# Patient Record
Sex: FEMALE | Race: BLACK OR AFRICAN AMERICAN | NOT HISPANIC OR LATINO | Employment: FULL TIME | URBAN - METROPOLITAN AREA
[De-identification: names, ages, dates, MRNs, and addresses within clinical notes are randomized per-mention and may not be internally consistent; named-entity substitution may affect disease eponyms.]

---

## 2023-11-14 ENCOUNTER — APPOINTMENT (OUTPATIENT)
Dept: RADIOLOGY | Facility: HOSPITAL | Age: 48
End: 2023-11-14
Payer: COMMERCIAL

## 2023-11-14 ENCOUNTER — HOSPITAL ENCOUNTER (EMERGENCY)
Facility: HOSPITAL | Age: 48
Discharge: HOME | End: 2023-11-15
Payer: COMMERCIAL

## 2023-11-14 VITALS
RESPIRATION RATE: 18 BRPM | SYSTOLIC BLOOD PRESSURE: 137 MMHG | WEIGHT: 219 LBS | TEMPERATURE: 97.8 F | HEIGHT: 62 IN | OXYGEN SATURATION: 100 % | DIASTOLIC BLOOD PRESSURE: 92 MMHG | BODY MASS INDEX: 40.3 KG/M2

## 2023-11-14 DIAGNOSIS — R10.9 FLANK PAIN: Primary | ICD-10-CM

## 2023-11-14 DIAGNOSIS — K76.89 HEPATIC CYST: ICD-10-CM

## 2023-11-14 LAB
ALBUMIN SERPL BCP-MCNC: 4 G/DL (ref 3.4–5)
ALP SERPL-CCNC: 60 U/L (ref 33–110)
ALT SERPL W P-5'-P-CCNC: 18 U/L (ref 7–45)
ANION GAP SERPL CALC-SCNC: 12 MMOL/L (ref 10–20)
APPEARANCE UR: CLEAR
AST SERPL W P-5'-P-CCNC: 22 U/L (ref 9–39)
BILIRUB SERPL-MCNC: 0.3 MG/DL (ref 0–1.2)
BILIRUB UR STRIP.AUTO-MCNC: NEGATIVE MG/DL
BUN SERPL-MCNC: 10 MG/DL (ref 6–23)
CALCIUM SERPL-MCNC: 9.2 MG/DL (ref 8.6–10.3)
CHLORIDE SERPL-SCNC: 105 MMOL/L (ref 98–107)
CO2 SERPL-SCNC: 24 MMOL/L (ref 21–32)
COLOR UR: YELLOW
CREAT SERPL-MCNC: 0.79 MG/DL (ref 0.5–1.05)
ERYTHROCYTE [DISTWIDTH] IN BLOOD BY AUTOMATED COUNT: 12.6 % (ref 11.5–14.5)
GFR SERPL CREATININE-BSD FRML MDRD: >90 ML/MIN/1.73M*2
GLUCOSE SERPL-MCNC: 76 MG/DL (ref 74–99)
GLUCOSE UR STRIP.AUTO-MCNC: NEGATIVE MG/DL
HCG UR QL IA.RAPID: NEGATIVE
HCT VFR BLD AUTO: 34.9 % (ref 36–46)
HGB BLD-MCNC: 12.1 G/DL (ref 12–16)
KETONES UR STRIP.AUTO-MCNC: NEGATIVE MG/DL
LACTATE SERPL-SCNC: 0.8 MMOL/L (ref 0.4–2)
LEUKOCYTE ESTERASE UR QL STRIP.AUTO: NEGATIVE
LIPASE SERPL-CCNC: 29 U/L (ref 9–82)
MCH RBC QN AUTO: 29.3 PG (ref 26–34)
MCHC RBC AUTO-ENTMCNC: 34.7 G/DL (ref 32–36)
MCV RBC AUTO: 85 FL (ref 80–100)
NITRITE UR QL STRIP.AUTO: NEGATIVE
NRBC BLD-RTO: 0 /100 WBCS (ref 0–0)
PH UR STRIP.AUTO: 6 [PH]
PLATELET # BLD AUTO: 236 X10*3/UL (ref 150–450)
POTASSIUM SERPL-SCNC: 3.8 MMOL/L (ref 3.5–5.3)
PROT SERPL-MCNC: 7.1 G/DL (ref 6.4–8.2)
PROT UR STRIP.AUTO-MCNC: NEGATIVE MG/DL
RBC # BLD AUTO: 4.13 X10*6/UL (ref 4–5.2)
RBC # UR STRIP.AUTO: ABNORMAL /UL
RBC #/AREA URNS AUTO: NORMAL /HPF
SODIUM SERPL-SCNC: 137 MMOL/L (ref 136–145)
SP GR UR STRIP.AUTO: 1.02
UROBILINOGEN UR STRIP.AUTO-MCNC: ABNORMAL MG/DL
WBC # BLD AUTO: 5.7 X10*3/UL (ref 4.4–11.3)
WBC #/AREA URNS AUTO: NORMAL /HPF

## 2023-11-14 PROCEDURE — 74176 CT ABD & PELVIS W/O CONTRAST: CPT | Performed by: SURGERY

## 2023-11-14 PROCEDURE — 81025 URINE PREGNANCY TEST: CPT | Performed by: EMERGENCY MEDICINE

## 2023-11-14 PROCEDURE — 81001 URINALYSIS AUTO W/SCOPE: CPT | Performed by: EMERGENCY MEDICINE

## 2023-11-14 PROCEDURE — 83690 ASSAY OF LIPASE: CPT

## 2023-11-14 PROCEDURE — 81003 URINALYSIS AUTO W/O SCOPE: CPT | Performed by: EMERGENCY MEDICINE

## 2023-11-14 PROCEDURE — 74176 CT ABD & PELVIS W/O CONTRAST: CPT

## 2023-11-14 PROCEDURE — 2500000004 HC RX 250 GENERAL PHARMACY W/ HCPCS (ALT 636 FOR OP/ED)

## 2023-11-14 PROCEDURE — 36415 COLL VENOUS BLD VENIPUNCTURE: CPT

## 2023-11-14 PROCEDURE — 85027 COMPLETE CBC AUTOMATED: CPT

## 2023-11-14 PROCEDURE — 99284 EMERGENCY DEPT VISIT MOD MDM: CPT | Mod: 25

## 2023-11-14 PROCEDURE — 80053 COMPREHEN METABOLIC PANEL: CPT

## 2023-11-14 PROCEDURE — 96361 HYDRATE IV INFUSION ADD-ON: CPT

## 2023-11-14 PROCEDURE — 83605 ASSAY OF LACTIC ACID: CPT

## 2023-11-14 PROCEDURE — 96374 THER/PROPH/DIAG INJ IV PUSH: CPT

## 2023-11-14 PROCEDURE — 99285 EMERGENCY DEPT VISIT HI MDM: CPT | Mod: 25

## 2023-11-14 RX ORDER — KETOROLAC TROMETHAMINE 30 MG/ML
30 INJECTION, SOLUTION INTRAMUSCULAR; INTRAVENOUS ONCE
Status: COMPLETED | OUTPATIENT
Start: 2023-11-14 | End: 2023-11-14

## 2023-11-14 RX ADMIN — KETOROLAC TROMETHAMINE 30 MG: 30 INJECTION, SOLUTION INTRAMUSCULAR at 22:23

## 2023-11-14 RX ADMIN — SODIUM CHLORIDE 1000 ML: 9 INJECTION, SOLUTION INTRAVENOUS at 22:22

## 2023-11-14 ASSESSMENT — LIFESTYLE VARIABLES
EVER FELT BAD OR GUILTY ABOUT YOUR DRINKING: NO
HAVE YOU EVER FELT YOU SHOULD CUT DOWN ON YOUR DRINKING: NO
HAVE PEOPLE ANNOYED YOU BY CRITICIZING YOUR DRINKING: NO
EVER HAD A DRINK FIRST THING IN THE MORNING TO STEADY YOUR NERVES TO GET RID OF A HANGOVER: NO
REASON UNABLE TO ASSESS: NO

## 2023-11-14 ASSESSMENT — COLUMBIA-SUICIDE SEVERITY RATING SCALE - C-SSRS
6. HAVE YOU EVER DONE ANYTHING, STARTED TO DO ANYTHING, OR PREPARED TO DO ANYTHING TO END YOUR LIFE?: NO
1. IN THE PAST MONTH, HAVE YOU WISHED YOU WERE DEAD OR WISHED YOU COULD GO TO SLEEP AND NOT WAKE UP?: NO
2. HAVE YOU ACTUALLY HAD ANY THOUGHTS OF KILLING YOURSELF?: NO

## 2023-11-14 ASSESSMENT — PAIN SCALES - GENERAL: PAINLEVEL_OUTOF10: 10 - WORST POSSIBLE PAIN

## 2023-11-14 ASSESSMENT — PAIN DESCRIPTION - LOCATION: LOCATION: OTHER (COMMENT)

## 2023-11-14 ASSESSMENT — PAIN - FUNCTIONAL ASSESSMENT: PAIN_FUNCTIONAL_ASSESSMENT: 0-10

## 2023-11-14 ASSESSMENT — PAIN DESCRIPTION - ORIENTATION: ORIENTATION: RIGHT

## 2023-11-15 LAB — HOLD SPECIMEN: NORMAL

## 2023-11-15 RX ORDER — NAPROXEN 500 MG/1
500 TABLET ORAL
Qty: 30 TABLET | Refills: 0 | Status: SHIPPED | OUTPATIENT
Start: 2023-11-15 | End: 2023-11-30

## 2023-11-15 NOTE — ED PROVIDER NOTES
Chief Complaint   Patient presents with    Flank Pain     R        48-year-old female arrives to the emergency department with a chief complaint of right flank pain.  Patient states that since Saturday she has had pain that has been waxing and waning, getting up to a 9 out of 10 to the point where she cannot walk, patient also states that she is having intermittent burning with urination and increased frequency.  Patient denies any fevers or chills at home, patient arrives hemodynamically stable.  Patient states that upon arrival to the emergency department she was having a 10 out of 10 pain, the pain has now subsided into a 7 out of 10.  The patient does have CVA tenderness on the right side.  However the patient over the last 2 weeks has been having intermittent back pain, patient was seen at an urgent care and given a muscle relaxer, states that this is a different type of pain.  Patient is alert and oriented x4  patient has a medical history of hyperlipidemia, and obesity      History provided by:  Patient   used: No         PmHx, PsHx, Allergies, Family Hx, social Hx reviewed as documented    A complete 10 point review of systems was performed and is negative except for as mentioned in the HPI.    Physical Exam:    General: Patient is AAOx3, appears well developed, well nourished, is a good historian, answers questions appropriately    HEENT: head normocephalic, atraumatic, PERRLA, EOMs intact, oropharynx without erythema or exudate, buccal mucosa intact without lesions, TMs unremarkable, nose is patent bilateral    Neck: supple, full ROM, negative for lymphadenopathy, JVD, thyromegaly, tracheal deviation, nuccal rigidity    Pulmonary: CTAB, no accessory muscle use, able to speak full clear sentences    Cardiac: HRRR, no murmurs, rubs or gallops    GI:  right flank pain with CVA tenderness, otherwise abdomen is obese, soft, non-tender, non-distended, BS + x 4, no masses or organomegaly, no  guarding or CVA tenderness noted, negative garcias's, mcburney's    Musculoskeletal: full weight bearing, GUARDADO, no joint effusions, clubbing or edema noted    Skin: intact, no lesions or rashes noted, turgor is good.    Neuro: patient follow commands, cranial nerves 2-12 grossly intact, motor strengths 5/5 upper and lower extremities, DTR's and sensation are symmetrical. No focal deficits.    Rectal/: No urinary burning, urgency, change in frequency.  Patient has no rectal complaints        Medical Decision Making   this patient was seen in the emergency department with an attending physician available at all times throughout their ED course    Primary consideration for this patient would be a right-sided kidney stone, pyelonephritis, hydro nephritis, other renal pathology.  Other consideration for the patient given her recent history would be a musculoskeletal back pain.  A CT abdomen pelvis without IV contrast will be used to further evaluate as well as urinalysis  and diagnostic blood work.  Patient given 30 mg of IV Toradol as well as 1 L of normal saline    The patient's diagnostic blood work is negative for any acute abnormality, the patient's hCG as well as her urinalysis was also negative for any acute abnormality or infectious process    The patient's CT scan shows a hepatic cyst that the patient is aware of, a 2 mm nonobstructing calculus inside the left kidney, no other calculus appreciated.  Patient also incidentally shows a patchy asymmetric opacity posterior to the right lung base, the patient is having no cough, no pneumonia symptoms, on review the patient states that she will follow this up with her primary care provider    Patient is amenable to the plan of discharge as outlined above, all patient's questions pertaining to their ED course were answered in their entirety.  Strict return precautions were discussed with the patient and they verbalized understanding.  Further, it was made clear to the  "patient that from an emergent basis, all effort and testing was done to eliminate any imminent dangerous or potentially dangerous conditions of the patient however if their symptoms get much worse or feel life-threatening, they are to return to the emergency department or call 911 immediately.       Diagnoses as of 11/29/23 0218   Flank pain   Hepatic cyst       The patient has had the following imaging during this ER visit: CT ABDOMEN PELVIS WO IV CONTRAST     Patient History   Past Medical History:   Diagnosis Date    Hyperlipemia      Past Surgical History:   Procedure Laterality Date    SHOULDER Right      No family history on file.  Social History     Tobacco Use    Smoking status: Never    Smokeless tobacco: Never   Vaping Use    Vaping Use: Never used   Substance Use Topics    Alcohol use: Not Currently    Drug use: Never       ED Triage Vitals [11/14/23 1935]   Temp Pulse Resp BP   36.6 °C (97.8 °F) -- 18 (!) 137/92      SpO2 Temp Source Heart Rate Source Patient Position   100 % Temporal -- --      BP Location FiO2 (%)     -- --       Vitals:    11/14/23 1935   BP: (!) 137/92   Resp: 18   Temp: 36.6 °C (97.8 °F)   TempSrc: Temporal   SpO2: 100%   Weight: 99.3 kg (219 lb)   Height: 1.575 m (5' 2\")               RYAN Smith-CNP  11/29/23 0219    "

## 2023-11-15 NOTE — ED TRIAGE NOTES
Pt to ED with c/o R sided flank pain since this weekend. Pt denies urinary symptoms. Pt reports vomiting this weekend but not since. Denies diarrhea. Pt took tylenol at 1700 with minimal relief.

## 2023-11-16 ENCOUNTER — OFFICE VISIT (OUTPATIENT)
Dept: PRIMARY CARE | Facility: CLINIC | Age: 48
End: 2023-11-16
Payer: COMMERCIAL

## 2023-11-16 VITALS
SYSTOLIC BLOOD PRESSURE: 120 MMHG | BODY MASS INDEX: 40.85 KG/M2 | RESPIRATION RATE: 16 BRPM | DIASTOLIC BLOOD PRESSURE: 78 MMHG | OXYGEN SATURATION: 100 % | HEART RATE: 94 BPM | HEIGHT: 62 IN | WEIGHT: 222 LBS | TEMPERATURE: 97.4 F

## 2023-11-16 DIAGNOSIS — E78.5 HYPERLIPIDEMIA, UNSPECIFIED HYPERLIPIDEMIA TYPE: ICD-10-CM

## 2023-11-16 DIAGNOSIS — R91.1 LESION OF RIGHT LUNG: Primary | ICD-10-CM

## 2023-11-16 DIAGNOSIS — Z01.419 WELL WOMAN EXAM: ICD-10-CM

## 2023-11-16 DIAGNOSIS — M54.50 LOW BACK PAIN, UNSPECIFIED BACK PAIN LATERALITY, UNSPECIFIED CHRONICITY, UNSPECIFIED WHETHER SCIATICA PRESENT: ICD-10-CM

## 2023-11-16 DIAGNOSIS — G89.29 CHRONIC RIGHT FLANK PAIN: ICD-10-CM

## 2023-11-16 DIAGNOSIS — R10.9 CHRONIC RIGHT FLANK PAIN: ICD-10-CM

## 2023-11-16 PROBLEM — M79.671 FOOT PAIN, RIGHT: Status: ACTIVE | Noted: 2021-05-11

## 2023-11-16 PROBLEM — M25.511 RIGHT SHOULDER PAIN: Status: ACTIVE | Noted: 2021-03-29

## 2023-11-16 PROBLEM — M54.9 BACK PAIN: Status: ACTIVE | Noted: 2021-01-14

## 2023-11-16 PROBLEM — M25.532 LEFT WRIST PAIN: Status: ACTIVE | Noted: 2021-01-14

## 2023-11-16 LAB — PREGNANCY TEST URINE, POC: NEGATIVE

## 2023-11-16 PROCEDURE — 81025 URINE PREGNANCY TEST: CPT | Performed by: INTERNAL MEDICINE

## 2023-11-16 PROCEDURE — 99203 OFFICE O/P NEW LOW 30 MIN: CPT | Performed by: INTERNAL MEDICINE

## 2023-11-16 PROCEDURE — 1036F TOBACCO NON-USER: CPT | Performed by: INTERNAL MEDICINE

## 2023-11-16 RX ORDER — ATORVASTATIN CALCIUM 20 MG/1
20 TABLET, FILM COATED ORAL
COMMUNITY
Start: 2021-04-07 | End: 2023-11-16 | Stop reason: WASHOUT

## 2023-11-16 RX ORDER — OMEPRAZOLE 20 MG/1
20 CAPSULE, DELAYED RELEASE ORAL
COMMUNITY
Start: 2023-05-23 | End: 2023-11-16 | Stop reason: WASHOUT

## 2023-11-16 RX ORDER — CYCLOBENZAPRINE HCL 10 MG
10 TABLET ORAL DAILY PRN
COMMUNITY
Start: 2021-03-29 | End: 2023-11-16 | Stop reason: WASHOUT

## 2023-11-16 SDOH — ECONOMIC STABILITY: FOOD INSECURITY: WITHIN THE PAST 12 MONTHS, THE FOOD YOU BOUGHT JUST DIDN'T LAST AND YOU DIDN'T HAVE MONEY TO GET MORE.: NEVER TRUE

## 2023-11-16 SDOH — ECONOMIC STABILITY: FOOD INSECURITY: WITHIN THE PAST 12 MONTHS, YOU WORRIED THAT YOUR FOOD WOULD RUN OUT BEFORE YOU GOT MONEY TO BUY MORE.: NEVER TRUE

## 2023-11-16 ASSESSMENT — LIFESTYLE VARIABLES
HOW OFTEN DO YOU HAVE SIX OR MORE DRINKS ON ONE OCCASION: NEVER
HOW OFTEN DO YOU HAVE A DRINK CONTAINING ALCOHOL: NEVER
AUDIT-C TOTAL SCORE: 0
HOW MANY STANDARD DRINKS CONTAINING ALCOHOL DO YOU HAVE ON A TYPICAL DAY: PATIENT DOES NOT DRINK
SKIP TO QUESTIONS 9-10: 1

## 2023-11-16 ASSESSMENT — PATIENT HEALTH QUESTIONNAIRE - PHQ9
1. LITTLE INTEREST OR PLEASURE IN DOING THINGS: NOT AT ALL
SUM OF ALL RESPONSES TO PHQ9 QUESTIONS 1 & 2: 0
2. FEELING DOWN, DEPRESSED OR HOPELESS: NOT AT ALL

## 2023-11-16 NOTE — PROGRESS NOTES
"Chief Complaint/HPI:  Initial visit with me, patient recently present to ER with c/o of right flank pain, previous physician was in [place], patient states. She moved to [place] about 2 years ago. Patient states that she has had the pain for some time, she developed worsening right flank pain requiring an ER visit. Patient states that she was treated with Naprosyn, it has not been very helpful.     Hyperlipidemia:  patient takes no medical therapy       ROS otherwise negative aside from what was mentioned above in HPI.      Patient Active Problem List   Diagnosis    Back pain    Foot pain, right    Left wrist pain    Right shoulder pain    Lesion of right lung         Past Medical History:   Diagnosis Date    Hyperlipemia      Past Surgical History:   Procedure Laterality Date    SHOULDER Right      Social History     Social History Narrative    Not on file         ALLERGIES  Aspirin      MEDICATIONS  Current Outpatient Medications on File Prior to Visit   Medication Sig Dispense Refill    naproxen (Naprosyn) 500 mg tablet Take 1 tablet (500 mg) by mouth 2 times a day with meals for 15 days. 30 tablet 0    [DISCONTINUED] atorvastatin (Lipitor) 20 mg tablet Take 1 tablet (20 mg) by mouth once daily.      [DISCONTINUED] cyclobenzaprine (Flexeril) 10 mg tablet Take 1 tablet (10 mg) by mouth once daily as needed for muscle spasms.      [DISCONTINUED] omeprazole (PriLOSEC) 20 mg DR capsule Take 1 capsule (20 mg) by mouth once daily in the morning. Take before meals.       No current facility-administered medications on file prior to visit.         PHYSICAL EXAM  /78 (BP Location: Right arm, Patient Position: Sitting, BP Cuff Size: Large adult)   Pulse 94   Temp 36.3 °C (97.4 °F)   Resp 16   Ht 1.575 m (5' 2\")   Wt 101 kg (222 lb)   LMP 10/28/2023 (Approximate)   SpO2 100%   BMI 40.60 kg/m²   Body mass index is 40.6 kg/m².  Gen: Alert, NAD, she is obese  HEENT:  EOMI, conjunctiva and sclera normal in " appearance  Respiratory:  Lungs CTAB, diminished breath sounds in the bases bilaterally. Tenderness noted in the right inferior posterior ribs, no obvious left or right flank tenderness is noted  Cardiovascular:  Heart RRR. No M/R/G, distant heart tones  Abdomen: obese, soft, slight RUQ tenderness is noted, no obvious palpable masses  Neuro:  Gross motor and sensory intact  Skin:  No suspicious lesions present on exposed skin    ASSESSMENT/PLAN  Problem List Items Addressed This Visit       Back pain    Overview     Last Assessment & Plan: Formatting of this note might be different from the original. Will give naprosyn a dmuscle relaxant to help,referred to PT         Relevant Orders    XR lumbar spine complete 4+ views    Lesion of right lung - Primary    Current Assessment & Plan     Reviewed recent abdominal CT , a nodular opacification is noted in the right lung base, patient denies any respiratory issues, she does admit to right low back, and possible right upper abdominal pain. She denies rash. Will check a CT of the chest, this seems to be the location of the pain. Will recheck an us of the liver and a LS spine x ray to try to localize the source of the pain.           Relevant Orders    CT chest wo IV contrast     Other Visit Diagnoses       Chronic right flank pain        Relevant Orders    US abdomen limited liver    Urinalysis with Reflex Microscopic    POCT Pregnancy, Urine manually resulted    Well woman exam        Relevant Orders    Referral to Gynecology    POCT Pregnancy, Urine manually resulted    Hyperlipidemia, unspecified hyperlipidemia type        Relevant Orders    Lipid panel          CMP and  CBC appear to be WNL, check a urine pregnancy prior to testing, need to further evaluate the lung lesion noted on CT of the abdomen, degenerative changes of the L spine were noted also, check an LS spine x ray    Follow up after testing    Obi Chairez MD

## 2023-11-16 NOTE — ASSESSMENT & PLAN NOTE
Reviewed recent abdominal CT , a nodular opacification is noted in the right lung base, patient denies any respiratory issues, she does admit to right low back, and possible right upper abdominal pain. She denies rash. Will check a CT of the chest, this seems to be the location of the pain. Will recheck an us of the liver and a LS spine x ray to try to localize the source of the pain.

## 2023-11-20 ENCOUNTER — ANCILLARY PROCEDURE (OUTPATIENT)
Dept: RADIOLOGY | Facility: CLINIC | Age: 48
End: 2023-11-20
Payer: COMMERCIAL

## 2023-11-20 ENCOUNTER — LAB (OUTPATIENT)
Dept: LAB | Facility: LAB | Age: 48
End: 2023-11-20
Payer: COMMERCIAL

## 2023-11-20 DIAGNOSIS — E78.5 HYPERLIPIDEMIA, UNSPECIFIED HYPERLIPIDEMIA TYPE: ICD-10-CM

## 2023-11-20 DIAGNOSIS — G89.29 CHRONIC RIGHT FLANK PAIN: ICD-10-CM

## 2023-11-20 DIAGNOSIS — M54.50 LOW BACK PAIN, UNSPECIFIED BACK PAIN LATERALITY, UNSPECIFIED CHRONICITY, UNSPECIFIED WHETHER SCIATICA PRESENT: ICD-10-CM

## 2023-11-20 DIAGNOSIS — R10.9 CHRONIC RIGHT FLANK PAIN: ICD-10-CM

## 2023-11-20 LAB
APPEARANCE UR: CLEAR
BILIRUB UR STRIP.AUTO-MCNC: NEGATIVE MG/DL
CHOLEST SERPL-MCNC: 211 MG/DL (ref 0–199)
CHOLESTEROL/HDL RATIO: 6.5
COLOR UR: ABNORMAL
GLUCOSE UR STRIP.AUTO-MCNC: NEGATIVE MG/DL
HDLC SERPL-MCNC: 32.5 MG/DL
KETONES UR STRIP.AUTO-MCNC: NEGATIVE MG/DL
LDLC SERPL CALC-MCNC: 164 MG/DL
LEUKOCYTE ESTERASE UR QL STRIP.AUTO: NEGATIVE
MUCOUS THREADS #/AREA URNS AUTO: NORMAL /LPF
NITRITE UR QL STRIP.AUTO: NEGATIVE
NON HDL CHOLESTEROL: 179 MG/DL (ref 0–149)
PH UR STRIP.AUTO: 5 [PH]
PROT UR STRIP.AUTO-MCNC: NEGATIVE MG/DL
RBC # UR STRIP.AUTO: ABNORMAL /UL
RBC #/AREA URNS AUTO: NORMAL /HPF
SP GR UR STRIP.AUTO: 1.01
SQUAMOUS #/AREA URNS AUTO: NORMAL /HPF
TRIGL SERPL-MCNC: 74 MG/DL (ref 0–149)
UROBILINOGEN UR STRIP.AUTO-MCNC: <2 MG/DL
VLDL: 15 MG/DL (ref 0–40)
WBC #/AREA URNS AUTO: NORMAL /HPF

## 2023-11-20 PROCEDURE — 80061 LIPID PANEL: CPT

## 2023-11-20 PROCEDURE — 36415 COLL VENOUS BLD VENIPUNCTURE: CPT

## 2023-11-20 PROCEDURE — 72110 X-RAY EXAM L-2 SPINE 4/>VWS: CPT

## 2023-11-20 PROCEDURE — 81001 URINALYSIS AUTO W/SCOPE: CPT

## 2023-12-05 ENCOUNTER — ANCILLARY PROCEDURE (OUTPATIENT)
Dept: RADIOLOGY | Facility: CLINIC | Age: 48
End: 2023-12-05
Payer: COMMERCIAL

## 2023-12-05 DIAGNOSIS — R10.9 CHRONIC RIGHT FLANK PAIN: ICD-10-CM

## 2023-12-05 DIAGNOSIS — R91.1 LESION OF RIGHT LUNG: ICD-10-CM

## 2023-12-05 DIAGNOSIS — G89.29 CHRONIC RIGHT FLANK PAIN: ICD-10-CM

## 2023-12-05 PROCEDURE — 76705 ECHO EXAM OF ABDOMEN: CPT | Performed by: RADIOLOGY

## 2023-12-05 PROCEDURE — 71250 CT THORAX DX C-: CPT

## 2023-12-05 PROCEDURE — 76705 ECHO EXAM OF ABDOMEN: CPT

## 2023-12-05 PROCEDURE — 71250 CT THORAX DX C-: CPT | Performed by: RADIOLOGY

## 2024-01-08 ASSESSMENT — ENCOUNTER SYMPTOMS
PARESTHESIAS: 1
BACK PAIN: 1
HEADACHES: 0
DYSURIA: 0
NUMBNESS: 0
LEG PAIN: 0
ABDOMINAL PAIN: 1
BOWEL INCONTINENCE: 0
FEVER: 0

## 2024-01-09 ENCOUNTER — LAB (OUTPATIENT)
Dept: LAB | Facility: LAB | Age: 49
End: 2024-01-09
Payer: COMMERCIAL

## 2024-01-09 ENCOUNTER — OFFICE VISIT (OUTPATIENT)
Dept: OBSTETRICS AND GYNECOLOGY | Facility: CLINIC | Age: 49
End: 2024-01-09
Payer: COMMERCIAL

## 2024-01-09 VITALS
DIASTOLIC BLOOD PRESSURE: 82 MMHG | BODY MASS INDEX: 37.32 KG/M2 | HEIGHT: 65 IN | SYSTOLIC BLOOD PRESSURE: 126 MMHG | WEIGHT: 224 LBS

## 2024-01-09 DIAGNOSIS — O09.529 ANTEPARTUM MULTIGRAVIDA OF ADVANCED MATERNAL AGE (HHS-HCC): ICD-10-CM

## 2024-01-09 DIAGNOSIS — Z12.11 COLON CANCER SCREENING: ICD-10-CM

## 2024-01-09 DIAGNOSIS — N91.5 OLIGOMENORRHEA, UNSPECIFIED TYPE: ICD-10-CM

## 2024-01-09 DIAGNOSIS — Z01.419 WELL WOMAN EXAM: ICD-10-CM

## 2024-01-09 DIAGNOSIS — L68.0 HIRSUTISM: Primary | ICD-10-CM

## 2024-01-09 DIAGNOSIS — L68.0 HIRSUTISM: ICD-10-CM

## 2024-01-09 DIAGNOSIS — N97.0 INFERTILITY ASSOCIATED WITH ANOVULATION: ICD-10-CM

## 2024-01-09 LAB
DHEA-S SERPL-MCNC: 53 UG/DL (ref 12–379)
FSH SERPL-ACNC: 5.2 IU/L
LH SERPL-ACNC: 5.1 IU/L
TSH SERPL-ACNC: 2 MIU/L (ref 0.44–3.98)

## 2024-01-09 PROCEDURE — 87086 URINE CULTURE/COLONY COUNT: CPT

## 2024-01-09 PROCEDURE — 82627 DEHYDROEPIANDROSTERONE: CPT

## 2024-01-09 PROCEDURE — 84443 ASSAY THYROID STIM HORMONE: CPT

## 2024-01-09 PROCEDURE — 84402 ASSAY OF FREE TESTOSTERONE: CPT

## 2024-01-09 PROCEDURE — 83001 ASSAY OF GONADOTROPIN (FSH): CPT

## 2024-01-09 PROCEDURE — 83002 ASSAY OF GONADOTROPIN (LH): CPT

## 2024-01-09 PROCEDURE — 36415 COLL VENOUS BLD VENIPUNCTURE: CPT

## 2024-01-09 PROCEDURE — 99203 OFFICE O/P NEW LOW 30 MIN: CPT | Performed by: OBSTETRICS & GYNECOLOGY

## 2024-01-09 PROCEDURE — 88175 CYTOPATH C/V AUTO FLUID REDO: CPT

## 2024-01-09 PROCEDURE — 1036F TOBACCO NON-USER: CPT | Performed by: OBSTETRICS & GYNECOLOGY

## 2024-01-09 PROCEDURE — 99386 PREV VISIT NEW AGE 40-64: CPT | Performed by: OBSTETRICS & GYNECOLOGY

## 2024-01-09 PROCEDURE — 87624 HPV HI-RISK TYP POOLED RSLT: CPT

## 2024-01-09 RX ORDER — NAPROXEN 500 MG/1
500 TABLET ORAL AS NEEDED
COMMUNITY

## 2024-01-09 ASSESSMENT — ENCOUNTER SYMPTOMS
ABDOMINAL PAIN: 1
DYSURIA: 0
PARESTHESIAS: 1
BOWEL INCONTINENCE: 0
NUMBNESS: 0
BACK PAIN: 1
UNEXPECTED WEIGHT CHANGE: 1
HEADACHES: 0
FEVER: 0
FREQUENCY: 1
SLEEP DISTURBANCE: 1
LEG PAIN: 0

## 2024-01-09 NOTE — PROGRESS NOTES
Subjective   Patient ID: Clarice Ballard is a 48 y.o. female who presents for Annual Exam (Here for annual exam ).  Back Pain  This is a recurrent problem. The current episode started more than 1 month ago. The problem occurs constantly. Associated symptoms include abdominal pain, bladder incontinence and paresthesias. Pertinent negatives include no bowel incontinence, chest pain, dysuria, fever, headaches, leg pain or numbness.    Clarice is a 48-year-old G0 with LMP 12/20 presents for yearly exam.  She says she was referred by her primary care for a gynecologic exam and that she is experiencing some lower back pain recently.  She reports within the last 2 years her cycles  have been very irregular.  This is unusual for her as she reports a history of chronic oligomenorrhea and amenorrhea.   She denies any significant dysmenorrhea.  She is currently  but says have had difficulty to get pregnant in the past.  She also reports hirsutism.  Hair growth excessively in the face but this is also common in her mom and siblings.       Review of Systems   Constitutional:  Positive for unexpected weight change. Negative for fever.   Cardiovascular:  Negative for chest pain.   Gastrointestinal:  Positive for abdominal pain. Negative for bowel incontinence.   Genitourinary:  Positive for bladder incontinence and frequency. Negative for dysuria.   Musculoskeletal:  Positive for back pain.   Skin:         Beard and mustach   Neurological:  Positive for paresthesias. Negative for numbness and headaches.   Psychiatric/Behavioral:  Positive for sleep disturbance.    All other systems reviewed and are negative.      Objective   Physical Exam  Vitals reviewed.   Constitutional:       Appearance: Normal appearance. She is obese.      Comments: Facial hirsutism   HENT:      Head: Normocephalic and atraumatic.      Nose: Nose normal.   Cardiovascular:      Rate and Rhythm: Normal rate and regular rhythm.   Pulmonary:      Effort:  Pulmonary effort is normal.      Breath sounds: Normal breath sounds.   Chest:      Chest wall: No mass.   Breasts:     Right: Normal.      Left: Normal.   Abdominal:      General: Abdomen is flat. Bowel sounds are normal. There is no distension.      Palpations: Abdomen is soft. There is no mass.   Genitourinary:     General: Normal vulva.      Vagina: Normal.      Cervix: Normal.      Uterus: Normal.       Adnexa: Right adnexa normal and left adnexa normal.      Rectum: Normal.   Musculoskeletal:         General: Normal range of motion.      Cervical back: Normal range of motion.   Skin:     General: Skin is warm and dry.   Neurological:      General: No focal deficit present.      Mental Status: She is alert.   Psychiatric:         Mood and Affect: Mood normal.         Behavior: Behavior normal.         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  However I have recommended a pelvic ultrasound since the CT scan was not really diagnostic of any pelvic pathology.  Due to prolonged history of oligomenorrhea , infertility and hirsutism I want to make sure she does not have any pelvic tumors.  I have strongly recommended weight loss with healthy diet and exercise.  I encouraged her to do self breast exam monthly and a mammogram was ordered.  The Pap smear and HPV test was also ordered.  Due to hirsutism and a prior history of oligomenorrhea and infertility this is suspicious for polycystic ovaries and I have recommended some blood work today.  I recommend a follow-up visit could be a telehealth in a month to review her labs and to see if there is any further studies need to be done.  Per her desire I have also referred her to reproductive endocrinology for possible pregnancy in the near future.  A urine for sent for frequency and  urgency and hematuria seen in last UA.    I also have recommended she adheres to shaving and avoid plucking or waxing.   We spent significant time discussing the pattern of her cycles, possible association with facial hirsutism, obesity and infertility and possible outcome of evaluations and options to proceed.  45 mins with majority of the time > 50 % discussing above symptoms possible diagnoses and possible risk for future uterine cancer and steps she can do to reduce her risks.            Grant Dupree MD 01/09/24 9:25 AM

## 2024-01-10 LAB — BACTERIA UR CULT: NORMAL

## 2024-01-16 LAB
TESTOSTERONE FREE (CHAN): 9.8 PG/ML (ref 0.1–6.4)
TESTOSTERONE,TOTAL,LC-MS/MS: 65 NG/DL (ref 2–45)

## 2024-01-17 ENCOUNTER — ANCILLARY PROCEDURE (OUTPATIENT)
Dept: RADIOLOGY | Facility: CLINIC | Age: 49
End: 2024-01-17
Payer: COMMERCIAL

## 2024-01-17 ENCOUNTER — APPOINTMENT (OUTPATIENT)
Dept: RADIOLOGY | Facility: CLINIC | Age: 49
End: 2024-01-17
Payer: COMMERCIAL

## 2024-01-17 VITALS — BODY MASS INDEX: 41.22 KG/M2 | HEIGHT: 62 IN | WEIGHT: 224 LBS

## 2024-01-17 DIAGNOSIS — R92.8 ABNORMALITY OF LEFT BREAST ON SCREENING MAMMOGRAM: ICD-10-CM

## 2024-01-17 DIAGNOSIS — Z01.419 WELL WOMAN EXAM: ICD-10-CM

## 2024-01-17 PROCEDURE — 77067 SCR MAMMO BI INCL CAD: CPT | Performed by: RADIOLOGY

## 2024-01-17 PROCEDURE — 77067 SCR MAMMO BI INCL CAD: CPT

## 2024-01-17 PROCEDURE — 77063 BREAST TOMOSYNTHESIS BI: CPT | Performed by: RADIOLOGY

## 2024-01-19 ENCOUNTER — HOSPITAL ENCOUNTER (OUTPATIENT)
Dept: RADIOLOGY | Facility: CLINIC | Age: 49
Discharge: HOME | End: 2024-01-19
Payer: COMMERCIAL

## 2024-01-19 DIAGNOSIS — N91.5 OLIGOMENORRHEA, UNSPECIFIED TYPE: ICD-10-CM

## 2024-01-19 DIAGNOSIS — Z01.419 WELL WOMAN EXAM: ICD-10-CM

## 2024-01-19 PROCEDURE — 76856 US EXAM PELVIC COMPLETE: CPT

## 2024-01-19 PROCEDURE — 76830 TRANSVAGINAL US NON-OB: CPT | Performed by: RADIOLOGY

## 2024-01-19 PROCEDURE — 76856 US EXAM PELVIC COMPLETE: CPT | Performed by: RADIOLOGY

## 2024-01-27 LAB — NONINV COLON CA DNA+OCC BLD SCRN STL QL: NEGATIVE

## 2024-02-01 ENCOUNTER — HOSPITAL ENCOUNTER (OUTPATIENT)
Dept: RADIOLOGY | Facility: HOSPITAL | Age: 49
Discharge: HOME | End: 2024-02-01
Payer: COMMERCIAL

## 2024-02-01 DIAGNOSIS — R92.8 ABNORMAL FINDINGS ON DIAGNOSTIC IMAGING OF BREAST: Primary | ICD-10-CM

## 2024-02-01 DIAGNOSIS — R92.8 ABNORMALITY OF LEFT BREAST ON SCREENING MAMMOGRAM: ICD-10-CM

## 2024-02-01 DIAGNOSIS — R92.1 CALCIFICATION OF LEFT BREAST ON MAMMOGRAPHY: ICD-10-CM

## 2024-02-01 PROCEDURE — 77061 BREAST TOMOSYNTHESIS UNI: CPT | Mod: LT

## 2024-02-01 PROCEDURE — 76641 ULTRASOUND BREAST COMPLETE: CPT | Mod: LT

## 2024-02-01 PROCEDURE — 77061 BREAST TOMOSYNTHESIS UNI: CPT | Mod: LEFT SIDE | Performed by: RADIOLOGY

## 2024-02-01 PROCEDURE — 77065 DX MAMMO INCL CAD UNI: CPT | Mod: LEFT SIDE | Performed by: RADIOLOGY

## 2024-02-01 PROCEDURE — 76642 ULTRASOUND BREAST LIMITED: CPT | Mod: LEFT SIDE | Performed by: RADIOLOGY

## 2024-02-01 NOTE — NURSING NOTE
After patient review of diagnostic results with Dr. Sun, support provided. Written literature regarding abnormal breast imaging and breast biopsy including what to expect before, during, and after the procedure reviewed with the patient. All questions answered. Patient selected Dr. Hernandez for surgical consultation 2/5 1430 with biopsy to follow 2/7 1415. Information provided and reviewed to include provider information and how to reach me directly with questions or concerns before concluding visit.

## 2024-02-01 NOTE — Clinical Note
Your patient Clarice Ballard seen for diagnostic breast imaging today will final results available for your review shortly. I assisted with follow up scheduling and education review today. She will see Dr. Hernandez for consultation 2/5, with biopsy to follow 2/7.

## 2024-02-01 NOTE — PROGRESS NOTES
Patient follow up scheduling:  left breast stereotactic biopsy per provider recommendation, 2/7 2:15PM.

## 2024-02-02 ENCOUNTER — APPOINTMENT (OUTPATIENT)
Dept: OBSTETRICS AND GYNECOLOGY | Facility: CLINIC | Age: 49
End: 2024-02-02
Payer: COMMERCIAL

## 2024-02-02 ASSESSMENT — ENCOUNTER SYMPTOMS
TINGLING: 1
BACK PAIN: 1
LEG PAIN: 1
HEADACHES: 1

## 2024-02-05 ENCOUNTER — OFFICE VISIT (OUTPATIENT)
Dept: SURGERY | Facility: CLINIC | Age: 49
End: 2024-02-05
Payer: COMMERCIAL

## 2024-02-05 VITALS
SYSTOLIC BLOOD PRESSURE: 121 MMHG | OXYGEN SATURATION: 97 % | BODY MASS INDEX: 41.26 KG/M2 | DIASTOLIC BLOOD PRESSURE: 81 MMHG | HEIGHT: 62 IN | WEIGHT: 224.2 LBS | HEART RATE: 76 BPM

## 2024-02-05 DIAGNOSIS — R92.1 BREAST CALCIFICATION, LEFT: Primary | ICD-10-CM

## 2024-02-05 PROCEDURE — 99204 OFFICE O/P NEW MOD 45 MIN: CPT | Performed by: SURGERY

## 2024-02-05 PROCEDURE — 1036F TOBACCO NON-USER: CPT | Performed by: SURGERY

## 2024-02-05 ASSESSMENT — ENCOUNTER SYMPTOMS
FEVER: 0
HEADACHES: 1
CONSTIPATION: 0
PALPITATIONS: 0
CHILLS: 0
BLOOD IN STOOL: 0
VOMITING: 0
DIZZINESS: 0
ABDOMINAL PAIN: 0
NAUSEA: 0
SHORTNESS OF BREATH: 0
DIARRHEA: 0
COUGH: 0

## 2024-02-05 NOTE — PATIENT INSTRUCTIONS
You will receive a phone call with the initial biopsy results. Follow up afterwards to discuss the results and next steps.    A breast MRI will be ordered for you.

## 2024-02-05 NOTE — PROGRESS NOTES
"GENERAL SURGERY CLINIC NOTE    Clarice Ballard   1975   [id number]     History Of Present Illness  Clarice Ballard is a 48 y.o. female who presents to the office for evaluation of left breast calcifications. She has not noticed any masses or abnormalities.    She underwent menarche at 12 and continues to have periods. She had no pregnancies. She took OCPs very briefly to regular her periods, but stopped due to side effects.    Last name jose Ocampo     Past Medical History  She has a past medical history of Hyperlipemia.    Surgical History  She has a past surgical history that includes XR shoulder (Right).    Medications  Current Outpatient Medications on File Prior to Visit   Medication Sig Dispense Refill    naproxen (EC Naprosyn) 500 mg EC tablet Take 1 tablet (500 mg) by mouth 2 times a day with meals. Do not crush, chew, or split.       No current facility-administered medications on file prior to visit.       Allergies  Aspirin and Oats     Social History  She reports that she has never smoked. She has never used smokeless tobacco. She reports that she does not currently use alcohol. She reports that she does not use drugs.    Family History  Family History   Problem Relation Name Age of Onset    Diabetes Father's Brother      Diabetes Maternal Grandfather     No fhx cancer     Review of Systems   Constitutional:  Negative for chills and fever.   Respiratory:  Negative for cough and shortness of breath.    Cardiovascular:  Negative for chest pain and palpitations.   Gastrointestinal:  Negative for abdominal pain, blood in stool, constipation, diarrhea, nausea and vomiting.   Neurological:  Positive for headaches. Negative for dizziness.        Recent   All other systems reviewed and are negative.      Last Recorded Vitals  Blood pressure 121/81, pulse 76, height 1.575 m (5' 2\"), weight 102 kg (224 lb 3.2 oz), last menstrual period 12/23/2023, SpO2 97 %.     Physical Exam  Constitutional:       " General: She is not in acute distress.     Appearance: Normal appearance. She is not ill-appearing.   HENT:      Head: Normocephalic and atraumatic.   Cardiovascular:      Rate and Rhythm: Normal rate and regular rhythm.   Pulmonary:      Effort: Pulmonary effort is normal. No respiratory distress.      Breath sounds: Normal breath sounds.   Chest:   Breasts:     Right: Normal. No mass, nipple discharge, skin change or tenderness.      Left: Normal. No mass, nipple discharge, skin change or tenderness.   Abdominal:      General: There is no distension.      Palpations: Abdomen is soft.      Tenderness: There is no abdominal tenderness. There is no guarding.   Musculoskeletal:         General: No swelling.   Lymphadenopathy:      Upper Body:      Right upper body: No axillary or pectoral adenopathy.      Left upper body: No axillary or pectoral adenopathy.   Skin:     General: Skin is warm and dry.   Neurological:      Mental Status: She is alert and oriented to person, place, and time. Mental status is at baseline.   Psychiatric:         Mood and Affect: Mood normal.         Behavior: Behavior normal.          Relevant Results  BI mammo left diagnostic tomosynthesis  Left breast: Indeterminate microcalcifications.   Left breast: Asymmetry mammography likely reflects benign entity. Six-month follow-up with mammography and sonography recommended   BI-RADS CATEGORY:   BI-RADS Category:  4 Suspicious. Recommendation:  Surgical Consultation and Biopsy. Recommended Date:  Immediate. Laterality:  Left.   Stereotactic core biopsy left breast recommended. Surgical consultation with history and physical required prior to biopsy.   Six-month mammographic follow-up left breast for asymmetry   For any future breast imaging appointments, please call [phone] ([phone]).     MACRO: None   Signed by: Diego Sun 2/1/2024 1:29 PM Dictation workstation:   [id number]    BI US breast complete left  Left breast: Indeterminate  microcalcifications.   Left breast: Asymmetry mammography likely reflects benign entity. Six-month follow-up with mammography and sonography recommended   BI-RADS CATEGORY:   BI-RADS Category:  4 Suspicious. Recommendation:  Surgical Consultation and Biopsy. Recommended Date:  Immediate. Laterality:  Left.   Stereotactic core biopsy left breast recommended. Surgical consultation with history and physical required prior to biopsy.   Six-month mammographic follow-up left breast for asymmetry   For any future breast imaging appointments, please call [phone]).     MACRO: None   Signed by: Diego Sun 2/1/2024 1:29 PM Dictation workstation:   [id number]    Assessment and Plan  48 y.o. female with left breast findings: a 1.1cm lesion at 7:00 5cm from the nipple that requires 6 mo follow up mammogram, and indeterminate calcifications in the lower outer quadrant for which she's undergoing biopsy 2/7/24. I will call her with the preliminary results and asked her to follow up to discuss the final results and next steps. For her dense breast tissue, I recommend considering a breast MRI, and she was amenable. An order will be placed. The patient expressed her understanding and all questions were answered.    Marian Hernandez MD, FACS  General Surgery

## 2024-02-07 ENCOUNTER — HOSPITAL ENCOUNTER (OUTPATIENT)
Dept: RADIOLOGY | Facility: HOSPITAL | Age: 49
Discharge: HOME | End: 2024-02-07
Payer: COMMERCIAL

## 2024-02-07 DIAGNOSIS — R92.8 ABNORMAL FINDINGS ON DIAGNOSTIC IMAGING OF BREAST: ICD-10-CM

## 2024-02-07 DIAGNOSIS — R92.1 CALCIFICATION OF LEFT BREAST ON MAMMOGRAPHY: ICD-10-CM

## 2024-02-07 DIAGNOSIS — R92.8 ABNORMAL MAMMOGRAM: ICD-10-CM

## 2024-02-07 PROCEDURE — 77065 DX MAMMO INCL CAD UNI: CPT | Mod: LEFT SIDE | Performed by: RADIOLOGY

## 2024-02-07 PROCEDURE — 88305 TISSUE EXAM BY PATHOLOGIST: CPT | Performed by: STUDENT IN AN ORGANIZED HEALTH CARE EDUCATION/TRAINING PROGRAM

## 2024-02-07 PROCEDURE — 2720000007 HC OR 272 NO HCPCS

## 2024-02-07 PROCEDURE — 19081 BX BREAST 1ST LESION STRTCTC: CPT | Mod: LT

## 2024-02-07 PROCEDURE — 88305 TISSUE EXAM BY PATHOLOGIST: CPT | Mod: TC,SUR,PORLAB | Performed by: SURGERY

## 2024-02-07 PROCEDURE — 77065 DX MAMMO INCL CAD UNI: CPT

## 2024-02-07 PROCEDURE — 19081 BX BREAST 1ST LESION STRTCTC: CPT | Mod: LEFT SIDE | Performed by: RADIOLOGY

## 2024-02-09 ENCOUNTER — LAB (OUTPATIENT)
Dept: LAB | Facility: LAB | Age: 49
End: 2024-02-09
Payer: COMMERCIAL

## 2024-02-09 ENCOUNTER — OFFICE VISIT (OUTPATIENT)
Dept: OBSTETRICS AND GYNECOLOGY | Facility: CLINIC | Age: 49
End: 2024-02-09
Payer: COMMERCIAL

## 2024-02-09 VITALS — WEIGHT: 222 LBS | DIASTOLIC BLOOD PRESSURE: 76 MMHG | SYSTOLIC BLOOD PRESSURE: 118 MMHG | BODY MASS INDEX: 40.6 KG/M2

## 2024-02-09 DIAGNOSIS — E66.3 OVERWEIGHT: ICD-10-CM

## 2024-02-09 LAB
ABO GROUP (TYPE) IN BLOOD: NORMAL
ANTIBODY SCREEN: NORMAL
EST. AVERAGE GLUCOSE BLD GHB EST-MCNC: 123 MG/DL
GLUCOSE P FAST SERPL-MCNC: 98 MG/DL (ref 74–99)
HBA1C MFR BLD: 5.9 %
INSULIN P FAST SERPL-ACNC: 37 UIU/ML (ref 3–25)
RH FACTOR (ANTIGEN D): NORMAL

## 2024-02-09 PROCEDURE — 82947 ASSAY GLUCOSE BLOOD QUANT: CPT

## 2024-02-09 PROCEDURE — 86900 BLOOD TYPING SEROLOGIC ABO: CPT

## 2024-02-09 PROCEDURE — 83525 ASSAY OF INSULIN: CPT

## 2024-02-09 PROCEDURE — 86901 BLOOD TYPING SEROLOGIC RH(D): CPT

## 2024-02-09 PROCEDURE — 99213 OFFICE O/P EST LOW 20 MIN: CPT | Performed by: OBSTETRICS & GYNECOLOGY

## 2024-02-09 PROCEDURE — 1036F TOBACCO NON-USER: CPT | Performed by: OBSTETRICS & GYNECOLOGY

## 2024-02-09 PROCEDURE — 83036 HEMOGLOBIN GLYCOSYLATED A1C: CPT

## 2024-02-09 PROCEDURE — 86850 RBC ANTIBODY SCREEN: CPT

## 2024-02-09 PROCEDURE — 36415 COLL VENOUS BLD VENIPUNCTURE: CPT

## 2024-02-09 NOTE — PROGRESS NOTES
Subjective   Patient ID: Clarice Ballard is a 48 y.o. female who presents for Aerodigestive Follow Up Visit (Oligomenorrhea, infertility and hirsutism, patient also status post breast biopsy of left breast).  RENUKA Oneil is a 48 years old G0 with prolong history of oligomenorrhea and infertility who was seen here for annual and was sent for a pelvic U/s and abs and is here for follow up.   She has had regular cycles for the st 2 years.   She is a  at [employer].      Review of Systems   All other systems reviewed and are negative.      Objective   Physical Exam  Constitutional:       Appearance: Normal appearance.   Pulmonary:      Effort: Pulmonary effort is normal.   Neurological:      Mental Status: She is alert.   Psychiatric:         Mood and Affect: Mood normal.         Assessment/Plan   Problem List Items Addressed This Visit             ICD-10-CM    Obesity complicating childbirth - Primary O99.214    Relevant Orders    Type And Screen (Completed)     Other Visit Diagnoses         Codes    Overweight     E66.3    Relevant Orders    Hemoglobin A1C (Completed)    Insulin, Fasting    Glucose, Fasting (Completed)          Her labs were reviewed today which showed normal thyroid,  FSH and H, but slightly elevated Testosterone levels.   I have discussed this and options to go on Spironolactone or hormonal contraceptions to decrease the levels that may help with excess hair growth but she is not really bothered by hirsutism and says she is used to shaving.   Hydrolysis is another option.   I ordered fasting Insulin and BS and and per her request T&S.     She is most likely  premenopausal and if she is seeking pregnancy, I can refer her to reproductive infertility specialist at .   Sponataneous pregnancy at her age is very unlikely.   She will need B mammogram in 6 months for follow up.  Left breast biopsy per Dr Ferraro is pending pathology.       Grant Dupree MD 02/09/24 8:45 AM

## 2024-02-14 DIAGNOSIS — R92.1 BREAST CALCIFICATION, LEFT: Primary | ICD-10-CM

## 2024-02-14 LAB
LABORATORY COMMENT REPORT: NORMAL
PATH REPORT.FINAL DX SPEC: NORMAL
PATH REPORT.GROSS SPEC: NORMAL
PATH REPORT.RELEVANT HX SPEC: NORMAL
PATH REPORT.TOTAL CANCER: NORMAL

## 2024-02-20 PROBLEM — M79.671 FOOT PAIN, RIGHT: Status: RESOLVED | Noted: 2021-05-11 | Resolved: 2024-02-20

## 2024-02-22 ENCOUNTER — TELEPHONE (OUTPATIENT)
Dept: SURGERY | Facility: CLINIC | Age: 49
End: 2024-02-22
Payer: COMMERCIAL

## 2024-02-22 NOTE — TELEPHONE ENCOUNTER
----- Message from Marian Hernandez MD sent at 2/20/2024  4:39 PM EST -----  I started a new case for preauthorization for this patient's breast MRI. Please follow up Wednesday/Thursday to find out if it has been approved. If not, we will probably have to cancel her MRI scheduled for Friday 2/23. Case # is [id number]. They may want more information, which should be faxed to [phone].

## 2024-02-23 ENCOUNTER — APPOINTMENT (OUTPATIENT)
Dept: RADIOLOGY | Facility: HOSPITAL | Age: 49
End: 2024-02-23
Payer: COMMERCIAL

## 2024-02-23 ENCOUNTER — HOSPITAL ENCOUNTER (OUTPATIENT)
Dept: RADIOLOGY | Facility: CLINIC | Age: 49
Discharge: HOME | End: 2024-02-23
Payer: COMMERCIAL

## 2024-02-23 ENCOUNTER — HOSPITAL ENCOUNTER (OUTPATIENT)
Dept: RADIOLOGY | Facility: HOSPITAL | Age: 49
Discharge: HOME | End: 2024-02-23
Payer: COMMERCIAL

## 2024-02-23 DIAGNOSIS — R92.1 BREAST CALCIFICATION, LEFT: ICD-10-CM

## 2024-02-23 PROCEDURE — 77049 MRI BREAST C-+ W/CAD BI: CPT

## 2024-02-23 PROCEDURE — A9575 INJ GADOTERATE MEGLUMI 0.1ML: HCPCS | Performed by: SURGERY

## 2024-02-23 PROCEDURE — 2550000001 HC RX 255 CONTRASTS: Performed by: SURGERY

## 2024-02-23 PROCEDURE — 77049 MRI BREAST C-+ W/CAD BI: CPT | Performed by: STUDENT IN AN ORGANIZED HEALTH CARE EDUCATION/TRAINING PROGRAM

## 2024-02-23 RX ORDER — GADOTERATE MEGLUMINE 376.9 MG/ML
0.2 INJECTION INTRAVENOUS
OUTPATIENT
Start: 2024-02-23

## 2024-02-23 RX ORDER — GADOTERATE MEGLUMINE 376.9 MG/ML
0.2 INJECTION INTRAVENOUS
Status: COMPLETED | OUTPATIENT
Start: 2024-02-23 | End: 2024-02-23

## 2024-02-23 RX ADMIN — GADOTERATE MEGLUMINE 20 ML: 376.9 INJECTION INTRAVENOUS at 10:11

## 2024-03-01 ENCOUNTER — OFFICE VISIT (OUTPATIENT)
Dept: SURGERY | Facility: CLINIC | Age: 49
End: 2024-03-01
Payer: COMMERCIAL

## 2024-03-01 VITALS
HEART RATE: 75 BPM | DIASTOLIC BLOOD PRESSURE: 76 MMHG | WEIGHT: 221.6 LBS | OXYGEN SATURATION: 96 % | SYSTOLIC BLOOD PRESSURE: 118 MMHG | HEIGHT: 62 IN | BODY MASS INDEX: 40.78 KG/M2

## 2024-03-01 DIAGNOSIS — R92.1 BREAST CALCIFICATION, LEFT: Primary | ICD-10-CM

## 2024-03-01 PROCEDURE — 1036F TOBACCO NON-USER: CPT | Performed by: SURGERY

## 2024-03-01 PROCEDURE — 99214 OFFICE O/P EST MOD 30 MIN: CPT | Performed by: SURGERY

## 2024-03-01 ASSESSMENT — ENCOUNTER SYMPTOMS
CHILLS: 0
VOMITING: 0
SHORTNESS OF BREATH: 0
DIARRHEA: 0
COUGH: 0
DIZZINESS: 0
PALPITATIONS: 0
FEVER: 0
CONSTIPATION: 0
BLOOD IN STOOL: 0
NAUSEA: 0
ABDOMINAL PAIN: 0

## 2024-03-01 NOTE — PROGRESS NOTES
GENERAL SURGERY CLINIC NOTE    Clarice Ballard   1975   [id number]     History Of Present Illness  Clarice Ballard is a 48 y.o. female who presents to the office for follow up of left breast calcifications after undergoing biopsy and MRI. She has not noticed any masses or abnormalities. She did experience mild discomfort and a lump for a few days after the biopsy, but it has improved.    She underwent menarche at 12 and continues to have periods. She had no pregnancies. She took OCPs very briefly to regular her periods, but stopped due to side effects.    Last name jose Ocampo     Past Medical History  She has a past medical history of Hyperlipemia.    Surgical History  She has a past surgical history that includes XR shoulder (Right) and Breast biopsy (Left).    Medications  Current Outpatient Medications on File Prior to Visit   Medication Sig Dispense Refill    naproxen (EC Naprosyn) 500 mg EC tablet Take 1 tablet (500 mg) by mouth 2 times a day with meals. Do not crush, chew, or split.       No current facility-administered medications on file prior to visit.       Allergies  Aspirin and Oats     Social History  She reports that she has never smoked. She has never used smokeless tobacco. She reports that she does not currently use alcohol. She reports that she does not use drugs.    Family History  Family History   Problem Relation Name Age of Onset    Diabetes Father's Brother      Diabetes Maternal Grandfather     No fhx cancer     Review of Systems   Constitutional:  Negative for chills and fever.   Respiratory:  Negative for cough and shortness of breath.    Cardiovascular:  Negative for chest pain and palpitations.   Gastrointestinal:  Negative for abdominal pain, blood in stool, constipation, diarrhea, nausea and vomiting.   Neurological:  Negative for dizziness.   All other systems reviewed and are negative.      Last Recorded Vitals  Last menstrual period 02/17/2024.     Physical  Exam  Constitutional:       General: She is not in acute distress.     Appearance: Normal appearance. She is not ill-appearing.   HENT:      Head: Normocephalic and atraumatic.   Cardiovascular:      Rate and Rhythm: Normal rate and regular rhythm.   Pulmonary:      Effort: Pulmonary effort is normal. No respiratory distress.      Breath sounds: Normal breath sounds.   Musculoskeletal:         General: No swelling.   Skin:     General: Skin is warm and dry.   Neurological:      Mental Status: She is alert and oriented to person, place, and time. Mental status is at baseline.   Psychiatric:         Mood and Affect: Mood normal.         Behavior: Behavior normal.          Relevant Results  MR breast bilateral w contrast full protocol  Probably benign suspected post biopsy changes throughout the upper outer left breast consistent. Short-term follow-up MRI is recommended in 6 months to ensure resolution of enhancement.   No MRI evidence of malignancy in the right breast.   BI-RADS CATEGORY: BI-RADS Category:  3 Probably Benign. Recommendation:  Short-term Interval Follow-up Imaging. Recommended Date:  6 Months. Laterality:  Left.   For any future breast imaging appointments, please call [phone]).     MACRO: None   Signed by: Delvin Talavera 2/23/2024 5:33 PM Dictation workstation:   [id number]    Breast biopsy pathology results reviewed.     BI mammo left diagnostic tomosynthesis  Left breast: Indeterminate microcalcifications.   Left breast: Asymmetry mammography likely reflects benign entity. Six-month follow-up with mammography and sonography recommended   BI-RADS CATEGORY:   BI-RADS Category:  4 Suspicious. Recommendation:  Surgical Consultation and Biopsy. Recommended Date:  Immediate. Laterality:  Left.   Stereotactic core biopsy left breast recommended. Surgical consultation with history and physical required prior to biopsy.   Six-month mammographic follow-up left breast for asymmetry   For any future  breast imaging appointments, please call [phone]).     MACRO: None   Signed by: Diego Sun 2/1/2024 1:29 PM Dictation workstation:   [id number]    BI US breast complete left  Left breast: Indeterminate microcalcifications.   Left breast: Asymmetry mammography likely reflects benign entity. Six-month follow-up with mammography and sonography recommended   BI-RADS CATEGORY:   BI-RADS Category:  4 Suspicious. Recommendation:  Surgical Consultation and Biopsy. Recommended Date:  Immediate. Laterality:  Left.   Stereotactic core biopsy left breast recommended. Surgical consultation with history and physical required prior to biopsy.   Six-month mammographic follow-up left breast for asymmetry   For any future breast imaging appointments, please call [phone]).     MACRO: None   Signed by: Diego Sun 2/1/2024 1:29 PM Dictation workstation:   [id number]    Assessment and Plan  48 y.o. female with left breast findings: a 1.1cm lesion at 7:00 5cm from the nipple that requires 6 mo follow up mammogram, and indeterminate calcifications in the lower outer quadrant with benign findings on pathologic evaluation. I reviewed the results of the biopsy and MRI with her. As interval MRI is recommended for follow up, I will order a breast MRI for 6 months from now (unable to order just L breast MRI). There were significant barriers to getting the recent MRI approved by insurance. If there are future barriers for approval, I asked the patient to let us know and we can consider switching it to a left diagnostic mammogram. She will receive a phone call with the results. Follow up on an as needed basis. The patient expressed her understanding and all questions were answered.    Marian Hernandez MD, FACS  General Surgery

## 2024-03-04 ENCOUNTER — OFFICE VISIT (OUTPATIENT)
Dept: PRIMARY CARE | Facility: CLINIC | Age: 49
End: 2024-03-04
Payer: COMMERCIAL

## 2024-03-04 VITALS
DIASTOLIC BLOOD PRESSURE: 75 MMHG | TEMPERATURE: 97.7 F | SYSTOLIC BLOOD PRESSURE: 116 MMHG | BODY MASS INDEX: 40.48 KG/M2 | RESPIRATION RATE: 16 BRPM | WEIGHT: 220 LBS | HEIGHT: 62 IN | HEART RATE: 92 BPM | OXYGEN SATURATION: 99 %

## 2024-03-04 DIAGNOSIS — R92.1 CALCIFICATION OF BREAST: ICD-10-CM

## 2024-03-04 DIAGNOSIS — R91.1 LESION OF RIGHT LUNG: ICD-10-CM

## 2024-03-04 DIAGNOSIS — E78.5 HYPERLIPIDEMIA, UNSPECIFIED HYPERLIPIDEMIA TYPE: Primary | ICD-10-CM

## 2024-03-04 DIAGNOSIS — E88.819 INSULIN RESISTANCE: ICD-10-CM

## 2024-03-04 DIAGNOSIS — R73.03 PREDIABETES: ICD-10-CM

## 2024-03-04 PROBLEM — R10.9 RIGHT FLANK PAIN: Status: ACTIVE | Noted: 2023-11-20

## 2024-03-04 PROBLEM — R92.8 ABNORMAL MAMMOGRAM: Status: ACTIVE | Noted: 2024-03-04

## 2024-03-04 PROBLEM — E66.3 OVERWEIGHT: Status: ACTIVE | Noted: 2024-02-09

## 2024-03-04 PROCEDURE — 99214 OFFICE O/P EST MOD 30 MIN: CPT | Performed by: INTERNAL MEDICINE

## 2024-03-04 PROCEDURE — 1036F TOBACCO NON-USER: CPT | Performed by: INTERNAL MEDICINE

## 2024-03-04 SDOH — ECONOMIC STABILITY: FOOD INSECURITY: WITHIN THE PAST 12 MONTHS, YOU WORRIED THAT YOUR FOOD WOULD RUN OUT BEFORE YOU GOT MONEY TO BUY MORE.: NEVER TRUE

## 2024-03-04 SDOH — ECONOMIC STABILITY: FOOD INSECURITY: WITHIN THE PAST 12 MONTHS, THE FOOD YOU BOUGHT JUST DIDN'T LAST AND YOU DIDN'T HAVE MONEY TO GET MORE.: NEVER TRUE

## 2024-03-04 ASSESSMENT — LIFESTYLE VARIABLES
SKIP TO QUESTIONS 9-10: 1
AUDIT-C TOTAL SCORE: 0
HOW OFTEN DO YOU HAVE SIX OR MORE DRINKS ON ONE OCCASION: NEVER
HOW MANY STANDARD DRINKS CONTAINING ALCOHOL DO YOU HAVE ON A TYPICAL DAY: PATIENT DOES NOT DRINK
HOW OFTEN DO YOU HAVE A DRINK CONTAINING ALCOHOL: NEVER

## 2024-03-04 ASSESSMENT — PATIENT HEALTH QUESTIONNAIRE - PHQ9
SUM OF ALL RESPONSES TO PHQ9 QUESTIONS 1 & 2: 0
2. FEELING DOWN, DEPRESSED OR HOPELESS: NOT AT ALL
1. LITTLE INTEREST OR PLEASURE IN DOING THINGS: NOT AT ALL

## 2024-03-04 NOTE — ASSESSMENT & PLAN NOTE
Trial of Ozempic as ordered, this may reduce abdominal obesity and may reduce glucoses, follow up in 3 months

## 2024-03-08 ENCOUNTER — APPOINTMENT (OUTPATIENT)
Dept: RADIOLOGY | Facility: HOSPITAL | Age: 49
End: 2024-03-08
Payer: COMMERCIAL

## 2024-03-20 ENCOUNTER — HOSPITAL ENCOUNTER (OUTPATIENT)
Dept: RADIOLOGY | Facility: CLINIC | Age: 49
Discharge: HOME | End: 2024-03-20
Payer: COMMERCIAL

## 2024-03-20 DIAGNOSIS — R91.1 LESION OF RIGHT LUNG: ICD-10-CM

## 2024-03-20 PROCEDURE — 71250 CT THORAX DX C-: CPT

## 2024-04-10 ENCOUNTER — CONSULT (OUTPATIENT)
Dept: ENDOCRINOLOGY | Facility: CLINIC | Age: 49
End: 2024-04-10
Payer: COMMERCIAL

## 2024-04-10 VITALS
WEIGHT: 215 LBS | TEMPERATURE: 99.1 F | DIASTOLIC BLOOD PRESSURE: 82 MMHG | BODY MASS INDEX: 39.32 KG/M2 | HEART RATE: 77 BPM | SYSTOLIC BLOOD PRESSURE: 131 MMHG

## 2024-04-10 DIAGNOSIS — Z31.41 FERTILITY TESTING: ICD-10-CM

## 2024-04-10 DIAGNOSIS — O09.529 ANTEPARTUM MULTIGRAVIDA OF ADVANCED MATERNAL AGE (HHS-HCC): ICD-10-CM

## 2024-04-10 DIAGNOSIS — Z01.812 ENCOUNTER FOR PREPROCEDURAL LABORATORY EXAMINATION: ICD-10-CM

## 2024-04-10 DIAGNOSIS — Z00.00 HEALTHCARE MAINTENANCE: ICD-10-CM

## 2024-04-10 DIAGNOSIS — Z11.59 ENCOUNTER FOR SCREENING FOR OTHER VIRAL DISEASES: ICD-10-CM

## 2024-04-10 DIAGNOSIS — Z11.3 SCREENING FOR STDS (SEXUALLY TRANSMITTED DISEASES): ICD-10-CM

## 2024-04-10 DIAGNOSIS — Z13.1 SCREENING FOR DIABETES MELLITUS: ICD-10-CM

## 2024-04-10 DIAGNOSIS — Z13.29 SCREENING FOR THYROID DISORDER: Primary | ICD-10-CM

## 2024-04-10 PROCEDURE — 99214 OFFICE O/P EST MOD 30 MIN: CPT | Performed by: NURSE PRACTITIONER

## 2024-04-10 PROCEDURE — 99204 OFFICE O/P NEW MOD 45 MIN: CPT | Performed by: NURSE PRACTITIONER

## 2024-04-10 RX ORDER — DOXYCYCLINE 100 MG/1
CAPSULE ORAL
Qty: 10 CAPSULE | Refills: 0 | Status: SHIPPED | OUTPATIENT
Start: 2024-04-10

## 2024-04-10 ASSESSMENT — PATIENT HEALTH QUESTIONNAIRE - PHQ9
2. FEELING DOWN, DEPRESSED OR HOPELESS: NOT AT ALL
1. LITTLE INTEREST OR PLEASURE IN DOING THINGS: NOT AT ALL
SUM OF ALL RESPONSES TO PHQ9 QUESTIONS 1 AND 2: 0

## 2024-04-10 ASSESSMENT — COLUMBIA-SUICIDE SEVERITY RATING SCALE - C-SSRS
1. IN THE PAST MONTH, HAVE YOU WISHED YOU WERE DEAD OR WISHED YOU COULD GO TO SLEEP AND NOT WAKE UP?: NO
6. HAVE YOU EVER DONE ANYTHING, STARTED TO DO ANYTHING, OR PREPARED TO DO ANYTHING TO END YOUR LIFE?: NO
2. HAVE YOU ACTUALLY HAD ANY THOUGHTS OF KILLING YOURSELF?: NO

## 2024-04-10 ASSESSMENT — PAIN SCALES - GENERAL: PAINLEVEL: 0-NO PAIN

## 2024-04-10 ASSESSMENT — ENCOUNTER SYMPTOMS
LOSS OF SENSATION IN FEET: 0
DEPRESSION: 0
OCCASIONAL FEELINGS OF UNSTEADINESS: 0

## 2024-04-10 NOTE — PROGRESS NOTES
Visit Type: In Person    NEW FERTILITY PATIENT VISIT    Referred by: OBGYN  Accompanied today by:  self       Clarice Ballard is a 49 y.o.  female who presents with trying to conceive. Tried about 5 years ago for 2-3 years and then more recently started trying again.    Infertility     PRIOR EVALUATION / TREATMENT  None    Hysterosalpingogram: n/a  Saline Infused Sonography: n/a  GYN Pelvic Ultrasound: normal 2024  Other:  n/a  Prior Labs  Lab Results    Date Done      AMH: No results found for requested labs within last 1825 days. No results found for requested labs within last 1825 days.   TSH: 2.00 (Ref range: 0.44 - 3.98 mIU/L) 2024   PRL: No results found for requested labs within last 1825 days. No results found for requested labs within last 1825 days.   Testosterone: No results found for requested labs within last 1825 days. No results found for requested labs within last 1825 days.   DHEAS: 53 (Ref range: 12 - 379 ug/dL) 2024   FSH: 5.2 (Ref range: IU/L) 2024   17 OHP: No results found for requested labs within last 1825 days. No results found for requested labs within last 1825 days.   HgbA1c: 5.9 (H; Ref range: see below %) 2024   Hepatitis B surface antigen: No results found for requested labs within last 1825 days. No results found for requested labs within last 1825 days.   Hepatitis C antibody: No results found for requested labs within last 1825 days. No results found for requested labs within last 1825 days.   HIV ½ Antigen Antibody screen with reflex: No results found for requested labs within last 1825 days. No results found for requested labs within last 1825 days.   Syphilis screening with reflex: No results found for requested labs within last 1825 days. No results found for requested labs within last 1825 days.   GC: No results found for requested labs within last 1825 days. No results found for requested labs within last 1825 days.   CT: No results found for  "requested labs within last 1825 days. No results found for requested labs within last 1825 days.   Type and Screen: O 2024   Rh: POS 2024   Antibody: NEG (Ref range: ) No results found for requested labs within last 1825 days.   Rubella: No results found for requested labs within last 1825 days. No results found for requested labs within last 1825 days.   Varicella: No results found for requested labs within last 1825 days. No results found for requested labs within last 1825 days.   Hemoglobin: No results found for requested labs within last 1825 days. No results found for requested labs within last 1825 days.   Hematocrit: No results found for requested labs within last 1825 days. No results found for requested labs within last 1825 days.   Creatinine: 0.79 (Ref range: 0.50 - 1.05 mg/dL) 2023   AST:22 (Ref range: 9 - 39 U/L) 2023   ALT:18 (Ref range: 7 - 45 U/L): 2023      Relationship Status:      OB Hx     OB History          0    Para   0    Term   0       0    AB   0    Living   0         SAB   0    IAB   0    Ectopic   0    Multiple   0    Live Births   0                 GYN HISTORY    History of STD or PID: Yes 10 years ago treated for CT  LMP: Patient's last menstrual period was 04/10/2024 (exact date).  Last pap smear:  No results found for: \"PAP\" 2024- normal, negative HPV  History of abnormal paps: no  History of abnormal mammogram: Yes, has had MRI's and biopsies  Date of last Mammogram :  plan to repeat in 2024  Coitus: 2-3x a month, long distance so can vary  x/fertile week    Pain with intercourse, bowel movements or full bladder: No     Pelvic pain: No    MENSTRUAL HISTORY:   Menarche:    Contraception:  pills x 1 month  Cycle length: more regular now, but whole life have been irregular where she only gets about 3 menses a year.   Q 28 days  Bleeding length: 3-4 days   Flow :  Average    Dysmenorrhea: No     ENDOCRINE HISTORY  Nipple " Discharge: No  Vision changes: No  Headaches: Yes. Was having some pressure and headaches, but resolved. Tension more in neck and sometimes headache.  Excess hair growth: Yes chin, shaves all the time  Acne: No  Oily skin:No  Recent weight change: Yes. Hard to lose weight. Struggled .  Significant exercise history: No  History of eating disorder: No    PMH  Past Medical History:   Diagnosis Date    Hyperlipemia         MEDICATIONS  Current Outpatient Medications on File Prior to Visit   Medication Sig Dispense Refill    semaglutide 0.25 mg or 0.5 mg (2 mg/3 mL) pen injector Inject 0.25 mg under the skin 1 (one) time per week. 9 mL 1    naproxen (EC Naprosyn) 500 mg EC tablet Take 1 tablet (500 mg) by mouth if needed. Do not crush, chew, or split.       No current facility-administered medications on file prior to visit.       PSH  Past Surgical History:   Procedure Laterality Date    BREAST BIOPSY Left     SHOULDER Right         PSYCH HISTORY  Past psych history: No  Prior hospitalization for mental health disorder: No     SOCIAL HISTORY  Occupation: teacher  Social History     Tobacco Use    Smoking status: Never     Passive exposure: Never    Smokeless tobacco: Never   Vaping Use    Vaping status: Never Used   Substance Use Topics    Alcohol use: Not Currently    Drug use: Never     History of incarceration: No  History of domestic violence: No  History of  incest or rape: No     PARTNER HISTORY  -patient is unsure of some of the history.  Partner: Name: Reyna Abarca  : 88  Occupation:   Prior fertility history: yes x 2 (8 and 5)  PMH: none  PSH: none  Past psych history: No  Prior hospitalization for mental health disorder: No  History of reproductive injuries or surgeries:: No  Smoking: Yes cigaretttes  Alcohol Use: No  Drug Use: No  History of incarceration: No  Medications: none  History of STD:  No  History of testosterone use: No  History of reproductive anomalies: No  Prior Semen Analysis  completed? No     FAMILY HISTORY   Family History   Problem Relation Name Age of Onset    Diabetes Father's Brother      Diabetes Maternal Grandfather       Family History of Blood Clots: No  Family history of breast, ovary, colon, endometrial cancer: No  None that she can recall    GENETIC HISTORY  Ethnic background patient: black  Ethnic background partner: black  Genetic Disease in Family: No  Birth Defects in Family: No  Genetic screening performed previously: No     BMI:   BMI Readings from Last 1 Encounters:   04/10/24 39.32 kg/m²     VITALS:  /82   Pulse 77   Temp 37.3 °C (99.1 °F)   Wt 97.5 kg (215 lb)   LMP 04/10/2024 (Exact Date)   BMI 39.32 kg/m²   LMP: Patient's last menstrual period was 04/10/2024 (exact date).    ASSESSMENT   49 y.o.  female with  primary infertility x 1 year, suspected oligoovulation and the following pertinent medical issues: AMA, pre-diabetes, hypertension, PCOS .  Partner SA: No Assessment    COUNSELING  We discussed causes of infertility including hormonal, egg quality issues, structural problems such as endometriosis, adhesions, or tubal problems, uterine factors such as polyps or fibroids, and sperm issues. Reviewed evaluation of such as well. We discussed various methods for achieving pregnancy in some detail including, ovulation induction, insemination, superovulation and IVF.    We discussed the impact of age on fertility. We discussed that a woman is born with all of the follicles that she will have in her lifetime and that these numbers progressively decrease as the patient reaches menopause. We discussed that women can remain fertile into their late 30’s and even early 40’s, however, chance for success is significantly lower for women who have infertility. We also discussed the higher rates of aneuploidy and miscarriage that occur as women age.    Reviewed she is past the age cut off for IVF with her own eggs. Would need donor eggs if she wishes to  proceed in this route.  Reviewed treatment cut off probably 50.    Reviewed very low chance of pregnancy given age with any treatment with her own eggs.     Patient wishes to proceed with testing:    INSTRUCTIONS FOR INFERTILITY TESTING    Hysterosalpingogram (HSG or x-ray dye test)  An HSG is a test used to make sure your fallopian tubes are not blocked.  This test can only be done between cycle days 5-11, so it is important for you to call as soon as your period starts to schedule an appointment.  You should take ibuprofen 30 minutes before your appointment as this test can cause transient cramping.      If you are allergic to iodine or shellfish, please inform the . Please call [phone] to schedule this appointment.    PLEASE START YOUR DOXYCYCLINE 1 DAY PRIOR TO YOUR TEST.    Boarding Pass 45 and Older Checklist    Date Done Testing Results   11/14/2023 CBC Plt: 236 (Ref range: 150 - 450 x10*3/uL)  Hct: 34.9 (L; Ref range: 36.0 - 46.0 %)   11/14/2023 CMP BUN: 10 (Ref range: 6 - 23 mg/dL)  Cre: 0.79 (Ref range: 0.50 - 1.05 mg/dL)  AST: 22 (Ref range: 9 - 39 U/L)  ALT: 18 (Ref range: 7 - 45 U/L)   11/20/2023 Lipid Panel Cholesterol: 211 (H; Ref range: 0 - 199 mg/dL)  HDL: 32.5  Cholesterol/HDL Ratio: 6.5  LDL: 164 (H; Ref range: <=99 mg/dL)  VLDL: 15 (Ref range: 0 - 40 mg/dL)  Triglycerides: 74 (Ref range: 0 - 149 mg/dL)  Non HDL Cholesterol: 179 (H; Ref range: 0 - 149 mg/dL)   2/9/2024 HgbA1C 5.9 (H; Ref range: see below %)   1/9/2024 TSH (with reflex to T4) TSH: 2.00 (Ref range: 0.44 - 3.98 mIU/L)  T4: No results found for requested labs within last 365 days.   To be done with PCP EKG To be done with PCP   1/17/2024 Mammogram ( > 40) (Q 1 Year) *Assessment  Overall: 0 - Need Additional Imaging Evaluation  Left:  -   Right:  -   *Recommendation(s): Additional Imaging Diagnostic Mammogram    Would like documentation of plan from MD for next mammogram date.   Needs to schedule MFM Clearance Clearance  Letter-Provider Reviewed: TBD   Needs to schedule PCP/Internal Medicine Clearance (if required) Clearance Letter-Provider Reviewed: TBD   Completed cologaurd. 1/24-normal Colonoscopy No results found for this or any previous visit.   TBD Transfer of Care (when pregnant) TBD       Routine Testing  Fertility Center  STDs Within 1 year   Genetic carrier Waiver/Completed   T&S Within 1 year   AMH Within 1 year   TSH Within 1 year   Rubella/Varicella Within 5 years     BMI Testing [facility]  CBC Within 1 year   CMP Within 1 year   HgbA1c Within 1 year   Mag, Phos, Vit D <18 Within 1 year   MFM > 40  REQ   Wt loss consult > 40 OPT     PLAN  Orders Placed This Encounter   Procedures    Hysterosalpingogram (HSG)    FL hysterosalpingogram    Antimullerian Hormone (Amh)    TSH with reflex to Free T4 if abnormal    Hemoglobin A1C    Rubella Antibody, Igg    Varicella Zoster Antibody, Igg    Hepatitis B surface antigen    Hepatitis C Antibody    HIV 1/2 Antigen/Antibody Screen with Reflex to Confirmation    Syphilis Screen with Reflex    C. Trachomatis / N. Gonorrhoeae, Amplified Detection    Referral to Maternal Fetal Medicine    POCT pregnancy, urine manually resulted       GENETIC SCREENING PATIENT  Reviewed and will think about it.    PARTNER  NA  NA    FOLLOW UP   Consults: MFM consult: indication:AMA -  to schedule.  Chart to primary nurse for care coordination and patient check list/education  Enroll in Engaged MD  Take prenatal vitamins, vitamin D 2000 IUs daily  Discussed that PAP and mammogram must be updated if appropriate based on age and clinical history and results received before treatment can begin  Discussed that treatment cannot proceed until checklist items are complete   6 week follow up with [facility]  Additional testing for BMI < 18 or > 40: No  Additional testing for age >45 needed.  If cleared for treatment ok to do TI but if wanting to do IUI partner will need a visit.     MD  Completion:  Ectopic Risk: Yes, hx of CT  Medically Complex: No    Fertility Plan Update:  If cleared for treatment probable plan for letrozole 5mg with TI. Will want a cd 21 progesterone first cycle to confirm ovulation.    Mariel Diop  04/10/2024  9:54 AM

## 2024-06-25 ENCOUNTER — LAB (OUTPATIENT)
Dept: LAB | Facility: LAB | Age: 49
End: 2024-06-25
Payer: COMMERCIAL

## 2024-06-25 ENCOUNTER — APPOINTMENT (OUTPATIENT)
Dept: PRIMARY CARE | Facility: CLINIC | Age: 49
End: 2024-06-25
Payer: COMMERCIAL

## 2024-06-25 VITALS
RESPIRATION RATE: 16 BRPM | WEIGHT: 209 LBS | DIASTOLIC BLOOD PRESSURE: 78 MMHG | TEMPERATURE: 97 F | OXYGEN SATURATION: 99 % | SYSTOLIC BLOOD PRESSURE: 126 MMHG | HEART RATE: 66 BPM | BODY MASS INDEX: 38.46 KG/M2 | HEIGHT: 62 IN

## 2024-06-25 DIAGNOSIS — E78.5 HYPERLIPIDEMIA, UNSPECIFIED HYPERLIPIDEMIA TYPE: ICD-10-CM

## 2024-06-25 DIAGNOSIS — R05.9 COUGH IN ADULT PATIENT: ICD-10-CM

## 2024-06-25 DIAGNOSIS — E88.819 INSULIN RESISTANCE: ICD-10-CM

## 2024-06-25 DIAGNOSIS — R73.03 PREDIABETES: ICD-10-CM

## 2024-06-25 DIAGNOSIS — Z11.59 ENCOUNTER FOR SCREENING FOR OTHER VIRAL DISEASES: ICD-10-CM

## 2024-06-25 DIAGNOSIS — Z31.41 FERTILITY TESTING: ICD-10-CM

## 2024-06-25 DIAGNOSIS — Z13.29 SCREENING FOR THYROID DISORDER: ICD-10-CM

## 2024-06-25 DIAGNOSIS — Z11.3 SCREENING FOR STDS (SEXUALLY TRANSMITTED DISEASES): ICD-10-CM

## 2024-06-25 DIAGNOSIS — R91.1 LESION OF RIGHT LUNG: ICD-10-CM

## 2024-06-25 DIAGNOSIS — R73.03 PREDIABETES: Primary | ICD-10-CM

## 2024-06-25 DIAGNOSIS — Z13.1 SCREENING FOR DIABETES MELLITUS: ICD-10-CM

## 2024-06-25 DIAGNOSIS — R92.1 CALCIFICATION OF BREAST: ICD-10-CM

## 2024-06-25 LAB
ALBUMIN SERPL BCP-MCNC: 4.3 G/DL (ref 3.4–5)
ALP SERPL-CCNC: 59 U/L (ref 33–110)
ALT SERPL W P-5'-P-CCNC: 20 U/L (ref 7–45)
ANION GAP SERPL CALC-SCNC: 10 MMOL/L (ref 10–20)
AST SERPL W P-5'-P-CCNC: 22 U/L (ref 9–39)
BILIRUB SERPL-MCNC: 0.4 MG/DL (ref 0–1.2)
BUN SERPL-MCNC: 5 MG/DL (ref 6–23)
CALCIUM SERPL-MCNC: 9.2 MG/DL (ref 8.6–10.3)
CHLORIDE SERPL-SCNC: 105 MMOL/L (ref 98–107)
CHOLEST SERPL-MCNC: 170 MG/DL (ref 0–199)
CHOLESTEROL/HDL RATIO: 5.2
CO2 SERPL-SCNC: 27 MMOL/L (ref 21–32)
CREAT SERPL-MCNC: 0.76 MG/DL (ref 0.5–1.05)
CREAT UR-MCNC: 219.3 MG/DL (ref 20–320)
EGFRCR SERPLBLD CKD-EPI 2021: >90 ML/MIN/1.73M*2
GLUCOSE SERPL-MCNC: 81 MG/DL (ref 74–99)
HBV SURFACE AG SERPL QL IA: NONREACTIVE
HCV AB SER QL: NONREACTIVE
HDLC SERPL-MCNC: 32.9 MG/DL
HIV 1+2 AB+HIV1 P24 AG SERPL QL IA: NONREACTIVE
LDLC SERPL CALC-MCNC: 112 MG/DL
MICROALBUMIN UR-MCNC: 11.9 MG/L
MICROALBUMIN/CREAT UR: 5.4 UG/MG CREAT
NON HDL CHOLESTEROL: 137 MG/DL (ref 0–149)
POTASSIUM SERPL-SCNC: 4 MMOL/L (ref 3.5–5.3)
PROT SERPL-MCNC: 6.9 G/DL (ref 6.4–8.2)
RUBV IGG SERPL IA-ACNC: 4.1 IA
RUBV IGG SERPL QL IA: POSITIVE
SODIUM SERPL-SCNC: 138 MMOL/L (ref 136–145)
TREPONEMA PALLIDUM IGG+IGM AB [PRESENCE] IN SERUM OR PLASMA BY IMMUNOASSAY: NONREACTIVE
TRIGL SERPL-MCNC: 128 MG/DL (ref 0–149)
TSH SERPL-ACNC: 1.82 MIU/L (ref 0.44–3.98)
VARICELLA ZOSTER IGG INDEX: 2.6 IA
VLDL: 26 MG/DL (ref 0–40)
VZV IGG SER QL IA: POSITIVE

## 2024-06-25 PROCEDURE — 86317 IMMUNOASSAY INFECTIOUS AGENT: CPT

## 2024-06-25 PROCEDURE — 87389 HIV-1 AG W/HIV-1&-2 AB AG IA: CPT

## 2024-06-25 PROCEDURE — 36415 COLL VENOUS BLD VENIPUNCTURE: CPT

## 2024-06-25 PROCEDURE — 87340 HEPATITIS B SURFACE AG IA: CPT

## 2024-06-25 PROCEDURE — 80061 LIPID PANEL: CPT

## 2024-06-25 PROCEDURE — 83516 IMMUNOASSAY NONANTIBODY: CPT

## 2024-06-25 PROCEDURE — 84443 ASSAY THYROID STIM HORMONE: CPT

## 2024-06-25 PROCEDURE — 86780 TREPONEMA PALLIDUM: CPT

## 2024-06-25 PROCEDURE — 99214 OFFICE O/P EST MOD 30 MIN: CPT | Performed by: INTERNAL MEDICINE

## 2024-06-25 PROCEDURE — 87591 N.GONORRHOEAE DNA AMP PROB: CPT

## 2024-06-25 PROCEDURE — 86787 VARICELLA-ZOSTER ANTIBODY: CPT

## 2024-06-25 PROCEDURE — 1036F TOBACCO NON-USER: CPT | Performed by: INTERNAL MEDICINE

## 2024-06-25 PROCEDURE — 87491 CHLMYD TRACH DNA AMP PROBE: CPT

## 2024-06-25 PROCEDURE — 82043 UR ALBUMIN QUANTITATIVE: CPT

## 2024-06-25 PROCEDURE — 80053 COMPREHEN METABOLIC PANEL: CPT

## 2024-06-25 PROCEDURE — 86803 HEPATITIS C AB TEST: CPT

## 2024-06-25 PROCEDURE — 82570 ASSAY OF URINE CREATININE: CPT

## 2024-06-25 PROCEDURE — 83036 HEMOGLOBIN GLYCOSYLATED A1C: CPT

## 2024-06-25 RX ORDER — BENZONATATE 200 MG/1
200 CAPSULE ORAL 3 TIMES DAILY PRN
Qty: 42 CAPSULE | Refills: 0 | Status: SHIPPED | OUTPATIENT
Start: 2024-06-25 | End: 2024-07-25

## 2024-06-25 SDOH — ECONOMIC STABILITY: FOOD INSECURITY: WITHIN THE PAST 12 MONTHS, THE FOOD YOU BOUGHT JUST DIDN'T LAST AND YOU DIDN'T HAVE MONEY TO GET MORE.: NEVER TRUE

## 2024-06-25 SDOH — ECONOMIC STABILITY: FOOD INSECURITY: WITHIN THE PAST 12 MONTHS, YOU WORRIED THAT YOUR FOOD WOULD RUN OUT BEFORE YOU GOT MONEY TO BUY MORE.: NEVER TRUE

## 2024-06-25 ASSESSMENT — LIFESTYLE VARIABLES
HOW OFTEN DO YOU HAVE SIX OR MORE DRINKS ON ONE OCCASION: NEVER
SKIP TO QUESTIONS 9-10: 1
AUDIT-C TOTAL SCORE: 0
HOW OFTEN DO YOU HAVE A DRINK CONTAINING ALCOHOL: NEVER
HOW MANY STANDARD DRINKS CONTAINING ALCOHOL DO YOU HAVE ON A TYPICAL DAY: PATIENT DOES NOT DRINK

## 2024-06-25 ASSESSMENT — PAIN SCALES - GENERAL: PAINLEVEL: 0-NO PAIN

## 2024-06-25 NOTE — PROGRESS NOTES
"Chief Complaint/HPI:    Follow up: patient did see fertility specialist, she still has all labs pending. The patient is due for lab testing , she has had dry cough for about 2 weeks. Patient does sneeze at times. Patient thinks that she noted symptoms after being exposed to some lawn fertilizer, she thinks     Pulmonary Nodules: follow up CT chest completed in 3/2024. She is currently undergoing regular 6-month MRI surveillance for a 1.1 cm left breast lesion with calcifications. Most recent CT revealed stable lung nodules     Lumbar stenosis without neurogenic claudication with lumbago: CT Chest wo contrast revealing: \"Minimal anterior wedging of the L1 and L2 vertebral bodies\".      HLD: She is not currently on medication therapy.      Prediabetic: She is not currently on medication therapy. Her most recent Hgb A1c 5.9 with a FBG of 123. Fasting insulin elevated 37. She has been taking the low dose semiglutide now, she had nausea and vomiting during week one. She is doing better now, she would like to try to continue to lose weight. Patient had left flank pain during most recent menses. This is not common for the patient, she states.      Breast calcifications: patient had breast needle biopsy completed, mammogram has been ordered by general surgery    ROS otherwise negative aside from what was mentioned above in HPI.      Patient Active Problem List   Diagnosis    Back pain    Left wrist pain    Right shoulder pain    Lesion of right lung    Well woman exam    Hirsutism    Obesity complicating childbirth (HHS-HCC)    Oligomenorrhea    Abnormal mammogram    Calcification of breast    Hyperlipidemia    Overweight    Right flank pain    Insulin resistance    Prediabetes         Past Medical History:   Diagnosis Date    Hyperlipemia     Hypertension     PCOS (polycystic ovarian syndrome)     Pre-diabetes      Past Surgical History:   Procedure Laterality Date    BREAST BIOPSY Left     SHOULDER Right      Social " "History     Social History Narrative    Not on file         ALLERGIES  Aspirin and Oats      MEDICATIONS  Current Outpatient Medications on File Prior to Visit   Medication Sig Dispense Refill    naproxen (EC Naprosyn) 500 mg EC tablet Take 1 tablet (500 mg) by mouth if needed. Do not crush, chew, or split.      [DISCONTINUED] semaglutide 0.25 mg or 0.5 mg (2 mg/3 mL) pen injector Inject 0.25 mg under the skin 1 (one) time per week. 9 mL 1    [DISCONTINUED] doxycycline (Vibramycin) 100 mg capsule Take 1 tab by mouth BID starting the day prior to your HSG. Take with at least 8 ounces (large glass) of water, do not lie down for 30 minutes after 10 capsule 0     No current facility-administered medications on file prior to visit.         PHYSICAL EXAM  /78 (BP Location: Left arm, Patient Position: Sitting, BP Cuff Size: Large adult)   Pulse 66   Temp 36.1 °C (97 °F)   Resp 16   Ht 1.575 m (5' 2\")   Wt 94.8 kg (209 lb)   SpO2 99%   BMI 38.23 kg/m²   Body mass index is 38.23 kg/m².  Gen: Alert, NAD, she is obese, she is quiet  HEENT:  EOMI, conjunctiva and sclera normal in appearance, no thyromegaly or neck masses  Respiratory:  Lungs CTAB, diminished breath sounds in the bases bilaterally.   Cardiovascular:  Heart RRR. No M/R/G, distant heart tones, no carotid bruits noted  Abdomen: obese, abdominal obesity is noted  Neuro:  Gross motor and sensory intact  Skin:  No suspicious lesions present on exposed skin          ASSESSMENT/PLAN  Problem List Items Addressed This Visit       Hyperlipidemia    Current Assessment & Plan     Check labs as ordered, patient has multiple labs pending          Insulin resistance    Relevant Medications    semaglutide 0.25 mg or 0.5 mg (2 mg/3 mL) pen injector    Prediabetes - Primary    Current Assessment & Plan     Check labs as ordered, will increase the dose of semiglutide to 0.5 mg now, will see if this is tolerated, may be able to increase the dose         Relevant " Medications    semaglutide 0.25 mg or 0.5 mg (2 mg/3 mL) pen injector     Check labs as ordered, patient has a number of labs ordered by fertility specialist also, that need to be completed     Follow up in 3 months    Obi Chairez MD

## 2024-06-25 NOTE — ASSESSMENT & PLAN NOTE
Check labs as ordered, will increase the dose of semiglutide to 0.5 mg now, will see if this is tolerated, may be able to increase the dose

## 2024-06-26 ENCOUNTER — APPOINTMENT (OUTPATIENT)
Dept: PRIMARY CARE | Facility: CLINIC | Age: 49
End: 2024-06-26
Payer: COMMERCIAL

## 2024-06-26 LAB
EST. AVERAGE GLUCOSE BLD GHB EST-MCNC: 111 MG/DL
HBA1C MFR BLD: 5.5 %

## 2024-06-27 LAB
C TRACH RRNA SPEC QL NAA+PROBE: NEGATIVE
N GONORRHOEA DNA SPEC QL PROBE+SIG AMP: NEGATIVE

## 2024-06-28 LAB — MIS SERPL-MCNC: 0.62 NG/ML

## 2024-07-09 ENCOUNTER — APPOINTMENT (OUTPATIENT)
Dept: ENDOCRINOLOGY | Facility: CLINIC | Age: 49
End: 2024-07-09
Payer: COMMERCIAL

## 2024-08-26 ENCOUNTER — HOSPITAL ENCOUNTER (OUTPATIENT)
Dept: RADIOLOGY | Facility: CLINIC | Age: 49
Discharge: HOME | End: 2024-08-26
Payer: COMMERCIAL

## 2024-08-26 DIAGNOSIS — R92.1 BREAST CALCIFICATION, LEFT: ICD-10-CM

## 2024-08-26 PROCEDURE — A9575 INJ GADOTERATE MEGLUMI 0.1ML: HCPCS | Performed by: SURGERY

## 2024-08-26 PROCEDURE — 2550000001 HC RX 255 CONTRASTS: Performed by: SURGERY

## 2024-08-26 PROCEDURE — 77049 MRI BREAST C-+ W/CAD BI: CPT | Performed by: STUDENT IN AN ORGANIZED HEALTH CARE EDUCATION/TRAINING PROGRAM

## 2024-08-26 PROCEDURE — 77049 MRI BREAST C-+ W/CAD BI: CPT

## 2024-08-26 RX ORDER — GADOTERATE MEGLUMINE 376.9 MG/ML
0.2 INJECTION INTRAVENOUS
Status: COMPLETED | OUTPATIENT
Start: 2024-08-26 | End: 2024-08-26

## 2024-08-28 ENCOUNTER — APPOINTMENT (OUTPATIENT)
Dept: RADIOLOGY | Facility: CLINIC | Age: 49
End: 2024-08-28
Payer: COMMERCIAL

## 2024-09-04 ENCOUNTER — TELEPHONE (OUTPATIENT)
Dept: SURGERY | Facility: CLINIC | Age: 49
End: 2024-09-04
Payer: COMMERCIAL

## 2024-09-04 DIAGNOSIS — R92.1 BREAST CALCIFICATION, LEFT: Primary | ICD-10-CM

## 2024-09-04 NOTE — TELEPHONE ENCOUNTER
----- Message from Marian Hernandez sent at 9/4/2024 11:55 AM EDT -----  Please let the patient know the results from her recent breast MRI. The left breast finding looks improved but she should undergo a repeat breast MRI in 6 months to make sure it continues to improve. I have ordered it.

## 2024-09-19 ENCOUNTER — APPOINTMENT (OUTPATIENT)
Dept: PRIMARY CARE | Facility: CLINIC | Age: 49
End: 2024-09-19
Payer: COMMERCIAL

## 2024-11-22 DIAGNOSIS — R73.03 PREDIABETES: ICD-10-CM

## 2024-11-22 DIAGNOSIS — E88.819 INSULIN RESISTANCE: ICD-10-CM

## 2024-11-26 RX ORDER — SEMAGLUTIDE 0.68 MG/ML
0.5 INJECTION, SOLUTION SUBCUTANEOUS
Qty: 9 ML | Refills: 1 | Status: SHIPPED | OUTPATIENT
Start: 2024-12-01

## 2025-01-22 ENCOUNTER — APPOINTMENT (OUTPATIENT)
Dept: PRIMARY CARE | Facility: CLINIC | Age: 50
End: 2025-01-22
Payer: COMMERCIAL

## 2025-01-22 VITALS
SYSTOLIC BLOOD PRESSURE: 177 MMHG | HEART RATE: 82 BPM | RESPIRATION RATE: 16 BRPM | OXYGEN SATURATION: 99 % | HEIGHT: 61 IN | WEIGHT: 222.6 LBS | TEMPERATURE: 97.6 F | DIASTOLIC BLOOD PRESSURE: 77 MMHG | BODY MASS INDEX: 42.03 KG/M2

## 2025-01-22 DIAGNOSIS — E88.819 INSULIN RESISTANCE: ICD-10-CM

## 2025-01-22 DIAGNOSIS — R92.1 CALCIFICATION OF BREAST: ICD-10-CM

## 2025-01-22 DIAGNOSIS — M48.56XA COLLAPSED VERTEBRA, NOT ELSEWHERE CLASSIFIED, LUMBAR REGION, INITIAL ENCOUNTER FOR FRACTURE (MULTI): ICD-10-CM

## 2025-01-22 DIAGNOSIS — G89.29 CHRONIC RIGHT-SIDED LOW BACK PAIN WITH RIGHT-SIDED SCIATICA: ICD-10-CM

## 2025-01-22 DIAGNOSIS — E66.01 MORBID OBESITY WITH BMI OF 40.0-44.9, ADULT (MULTI): ICD-10-CM

## 2025-01-22 DIAGNOSIS — M54.41 CHRONIC RIGHT-SIDED LOW BACK PAIN WITH RIGHT-SIDED SCIATICA: ICD-10-CM

## 2025-01-22 DIAGNOSIS — R91.1 LESION OF RIGHT LUNG: ICD-10-CM

## 2025-01-22 DIAGNOSIS — R73.03 PREDIABETES: ICD-10-CM

## 2025-01-22 DIAGNOSIS — E78.5 HYPERLIPIDEMIA, UNSPECIFIED HYPERLIPIDEMIA TYPE: Primary | ICD-10-CM

## 2025-01-22 PROCEDURE — 3008F BODY MASS INDEX DOCD: CPT | Performed by: INTERNAL MEDICINE

## 2025-01-22 PROCEDURE — 99214 OFFICE O/P EST MOD 30 MIN: CPT | Performed by: INTERNAL MEDICINE

## 2025-01-22 PROCEDURE — 1036F TOBACCO NON-USER: CPT | Performed by: INTERNAL MEDICINE

## 2025-01-22 SDOH — ECONOMIC STABILITY: FOOD INSECURITY: WITHIN THE PAST 12 MONTHS, THE FOOD YOU BOUGHT JUST DIDN'T LAST AND YOU DIDN'T HAVE MONEY TO GET MORE.: NEVER TRUE

## 2025-01-22 SDOH — ECONOMIC STABILITY: FOOD INSECURITY: WITHIN THE PAST 12 MONTHS, YOU WORRIED THAT YOUR FOOD WOULD RUN OUT BEFORE YOU GOT MONEY TO BUY MORE.: NEVER TRUE

## 2025-01-22 ASSESSMENT — LIFESTYLE VARIABLES
SKIP TO QUESTIONS 9-10: 1
HOW OFTEN DO YOU HAVE A DRINK CONTAINING ALCOHOL: NEVER
AUDIT-C TOTAL SCORE: 0
HOW OFTEN DO YOU HAVE SIX OR MORE DRINKS ON ONE OCCASION: NEVER
HOW MANY STANDARD DRINKS CONTAINING ALCOHOL DO YOU HAVE ON A TYPICAL DAY: PATIENT DOES NOT DRINK

## 2025-01-22 NOTE — PROGRESS NOTES
"Chief Complaint/HPI:    Follow up: patient did see fertility specialist, patient was advised that due to her age, options were limited. Patient did have testing but no further follow up      Pulmonary Nodules: follow up CT chest was completed in 3/2024. She is currently undergoing regular 6-month MRI surveillance for a 1.1 cm left breast lesion with calcifications. Most recent CT revealed stable lung nodules, she is recommended to have a follow up in 2 to 4 years     Lumbar stenosis without neurogenic claudication with lumbago: CT Chest wo contrast revealing: \"Minimal anterior wedging of the L1 and L2 vertebral bodies\" noted on x ray in 11/2023. Patient has developed discomfort in the right buttocks region that radiates to the right thigh. Menses seems to make the discomfort worse.       HLD: She is not currently on medication therapy.      Prediabetic:  patient has been taking Ozempic 0.5 mg weekly, she has not increased the dose, she has noted some right lower back discomfort recently. It is recurrent, it radiates to the right buttocks and thigh      Breast calcifications: patient had breast needle biopsy completed, patient is to have a follow up MRI of the breast completed in 3/2025    ROS otherwise negative aside from what was mentioned above in HPI.      Patient Active Problem List   Diagnosis    Back pain    Left wrist pain    Right shoulder pain    Lesion of right lung    Well woman exam    Hirsutism    Obesity complicating childbirth (HHS-HCC)    Oligomenorrhea    Abnormal mammogram    Calcification of breast    Hyperlipidemia    Overweight    Right flank pain    Insulin resistance    Prediabetes    Cough in adult patient    Collapsed vertebra, not elsewhere classified, lumbar region, initial encounter for fracture (Multi)         Past Medical History:   Diagnosis Date    Hyperlipemia     Hypertension     PCOS (polycystic ovarian syndrome)     Pre-diabetes      Past Surgical History:   Procedure Laterality " "Date    BREAST BIOPSY Left     SHOULDER Right      Social History     Social History Narrative    Not on file         ALLERGIES  Aspirin and Oats      MEDICATIONS  Current Outpatient Medications on File Prior to Visit   Medication Sig Dispense Refill    [DISCONTINUED] semaglutide (Ozempic) 0.25 mg or 0.5 mg (2 mg/3 mL) pen injector Inject 0.5 mg under the skin 1 (one) time per week. 9 mL 1    [DISCONTINUED] naproxen (EC Naprosyn) 500 mg EC tablet Take 1 tablet (500 mg) by mouth if needed. Do not crush, chew, or split. (Patient not taking: Reported on 1/22/2025)       No current facility-administered medications on file prior to visit.         PHYSICAL EXAM  /77 (BP Location: Right arm, Patient Position: Sitting, BP Cuff Size: Large adult)   Pulse 82   Temp 36.4 °C (97.6 °F) (Temporal)   Resp 16   Ht 1.549 m (5' 1\")   Wt 101 kg (222 lb 9.6 oz)   SpO2 99%   BMI 42.06 kg/m²   Body mass index is 42.06 kg/m².  Gen: Alert, NAD, she is obese, she is quiet  HEENT:  EOMI, conjunctiva and sclera normal in appearance, no thyromegaly or neck masses  Respiratory:  Lungs CTAB, diminished breath sounds in the bases bilaterally.   Cardiovascular:  Heart RRR. No M/R/G, distant heart tones, no carotid bruits noted  Abdomen: obese  Neuro:  Gross motor and sensory intact  MSK: positive SLR right lower extremity, patient develops discomfort in the lumbar and right para lumbar musculature. Discomfort with external rotation of the RLE is noted also   Skin:  No suspicious lesions present on exposed skin          ASSESSMENT/PLAN  Problem List Items Addressed This Visit       Back pain    Relevant Orders    MR lumbar spine w and wo IV contrast    Referral to Sports Medicine    CBC and Auto Differential    Comprehensive Metabolic Panel    Calcification of breast    Current Assessment & Plan     Patient will have follow up MRI of the breast, already scheduled in 3/2025         Relevant Orders    CBC and Auto Differential    " Comprehensive Metabolic Panel    Vitamin D 25-Hydroxy,Total (for eval of Vitamin D levels)    Collapsed vertebra, not elsewhere classified, lumbar region, initial encounter for fracture (Multi)    Hyperlipidemia - Primary    Current Assessment & Plan     Check labs, patient takes no med therapy         Relevant Orders    CBC and Auto Differential    Comprehensive Metabolic Panel    Lipid Panel    Insulin resistance    Current Assessment & Plan     Patient remains obese, will increase the dose of Ozempic, refer to clinical pharmacy for further evaluation and treatment if able         Relevant Medications    semaglutide (OZEMPIC) 1 mg/dose (4 mg/3 mL) pen injector    Other Relevant Orders    Referral to Clinical Pharmacy    Albumin-Creatinine Ratio, Urine Random    CBC and Auto Differential    Comprehensive Metabolic Panel    Hemoglobin A1C    Lesion of right lung    Relevant Orders    CBC and Auto Differential    Comprehensive Metabolic Panel    Prediabetes    Relevant Medications    semaglutide (OZEMPIC) 1 mg/dose (4 mg/3 mL) pen injector    Other Relevant Orders    Referral to Clinical Pharmacy    Albumin-Creatinine Ratio, Urine Random    CBC and Auto Differential    Comprehensive Metabolic Panel    Hemoglobin A1C     Other Visit Diagnoses       Morbid obesity with BMI of 40.0-44.9, adult (Multi)        Relevant Medications    semaglutide (OZEMPIC) 1 mg/dose (4 mg/3 mL) pen injector    Other Relevant Orders    Referral to Clinical Pharmacy    CBC and Auto Differential    Comprehensive Metabolic Panel    TSH with reflex to Free T4 if abnormal    Vitamin D 25-Hydroxy,Total (for eval of Vitamin D levels)          Follow up in about 3-4 months, check labs as ordered    Obi Chairez MD

## 2025-01-22 NOTE — ASSESSMENT & PLAN NOTE
Patient remains obese, will increase the dose of Ozempic, refer to clinical pharmacy for further evaluation and treatment if able

## 2025-01-31 ENCOUNTER — APPOINTMENT (OUTPATIENT)
Dept: PHARMACY | Facility: HOSPITAL | Age: 50
End: 2025-01-31
Payer: COMMERCIAL

## 2025-01-31 DIAGNOSIS — E88.819 INSULIN RESISTANCE: ICD-10-CM

## 2025-01-31 DIAGNOSIS — E66.01 MORBID OBESITY WITH BMI OF 40.0-44.9, ADULT (MULTI): ICD-10-CM

## 2025-01-31 DIAGNOSIS — R73.03 PREDIABETES: ICD-10-CM

## 2025-01-31 NOTE — PROGRESS NOTES
I reviewed the progress note and agree with the resident’s findings and plans as written. Case discussed with resident.    Vanesa Ortega, SaadD

## 2025-01-31 NOTE — PROGRESS NOTES
Pharmacist Clinic: Weight loss Management  Clarice Ballard is a 49 y.o. female was referred to Clinical Pharmacy Team for weight loss management.   Referring Provider: Obi Chairez, *  Last visit: 1/22/2025    Subjective     HPI    REVIEW FROM LAST VISIT  Last Visit: First  On ozempic for weight loss and prediabetes    CURRENT PHARMACOTHERAPY   Ozempic 0.5 mg weekly - Saturdays  Plans to start 1 mg dose tomorrow    PAST NON-PHARM/ PHARMACOTHERAPY  Metformin - had a reaction but does not remember what it was    WEIGHT LOSS ASSESSMENT   BMI: 42.06 kg/m²  Recorded Home Weight(s):  Baseline: 224 lbs  Last apt: -  Current: 222 lbs   Diet:   Notice curbing of appetite  A lot of veggies  A little carbs and mostly fruits when available  Drink water - sparking water once in awhile  Exercise Routine: none      MEDICATION ASSESSMENT  Affordability/Accessibility: 25$/ 28 ds  Adherence/Organization: no issues at the moment   Adverse Reactions: increase in dose - feeling a little nausea but has resolved    Relevant PMH:  PMH of Pancreatitis? No  PMH of Retinopathy? No  PMH of MTC? No    Objective     There were no vitals taken for this visit.    Allergies   Allergen Reactions    Aspirin Shortness of breath    Oats Dizziness, Drowsiness, Fever, Headache and HIT (heparin induced thrombocytopenia)       Lab Review  Lab Results   Component Value Date    BILITOT 0.4 06/25/2024    CALCIUM 9.2 06/25/2024    CO2 27 06/25/2024     06/25/2024    CREATININE 0.76 06/25/2024    GLUCOSE 81 06/25/2024    ALKPHOS 59 06/25/2024    K 4.0 06/25/2024    PROT 6.9 06/25/2024     06/25/2024    AST 22 06/25/2024    ALT 20 06/25/2024    BUN 5 (L) 06/25/2024    ANIONGAP 10 06/25/2024    ALBUMIN 4.3 06/25/2024    LIPASE 29 11/14/2023     Lab Results   Component Value Date    TRIG 128 06/25/2024    CHOL 170 06/25/2024    HDL 32.9 06/25/2024     Lab Results   Component Value Date    HGBA1C 5.5 06/25/2024    HGBA1C 5.9 (H) 02/09/2024     HGBA1C 6.0 (H) 04/05/2021     The 10-year ASCVD risk score (Dulce HARRIS, et al., 2019) is: 11.6%    Values used to calculate the score:      Age: 49 years      Sex: Female      Is Non- : Yes      Diabetic: No      Tobacco smoker: No      Systolic Blood Pressure: 177 mmHg      Is BP treated: No      HDL Cholesterol: 32.9 mg/dL      Total Cholesterol: 170 mg/dL    COUNSELING:   Counseled patient on MOA, expectations, side effects, duration of therapy, contraindications, administration, and monitoring parameters  Answered all patient questions and concerns; provided [facility] phone number if issues/questions arise  ETOH (age>18): Practice safe drinking habits and advice to cut down on liquor/beers as applies   Diet, Weight gain: Advise heart healthy diet (low carbs, low fat; add more fruits/veges and whole grain food) and regular exercise 30 mins daily x 5 days per week.  Also rec 10 mins of aerobic exercise/ jogging daily x 5 days per wk    Assessment/Plan   Problem List Items Addressed This Visit       Insulin resistance    Relevant Orders    Referral to Clinical Pharmacy    Prediabetes    Relevant Orders    Referral to Clinical Pharmacy     Other Visit Diagnoses       Morbid obesity with BMI of 40.0-44.9, adult (Multi)        Relevant Orders    Referral to Clinical Pharmacy          DISCUSSION/ NOTES:  Pt was referred to the Clinical Pharmacy team to help with ozempic titration and weight management. Has been on Ozempic 0.5 mg weekly. Has not yet  the 1 mg, stated that she will  today for her dose tmrw. Pt reported nausea last time there was an increased in dose. Advised pt to avoid oily/ greasy food that can worsen the side effect. Pt is to start on ozempic 1 mg weekly. Will follow up in 1 month to see how she is tolerating it.    PLAN:  INCREASE   Ozempic to 1 mg weekly  Education provided:   Target goal 5% to 10% weight loss within 3-6 months   Answered all patient questions and  concerns; provided [facility] phone number if issues/questions arise   Follow up with clinical pharmacist: 2/28/25 @ 3:20 PM    Thank you,  Mary Gomez, [facility] MED - PGY1 Resident    Verbal consent to manage patient's drug therapy was obtained from the patient. They were informed they may decline to participate or withdraw from participation in pharmacy services at any time.

## 2025-02-01 LAB
25(OH)D3+25(OH)D2 SERPL-MCNC: 14 NG/ML (ref 30–100)
ALBUMIN SERPL-MCNC: 4.4 G/DL (ref 3.6–5.1)
ALP SERPL-CCNC: 45 U/L (ref 31–125)
ALT SERPL-CCNC: 15 U/L (ref 6–29)
ANION GAP SERPL CALCULATED.4IONS-SCNC: 10 MMOL/L (CALC) (ref 7–17)
AST SERPL-CCNC: 19 U/L (ref 10–35)
BASOPHILS # BLD AUTO: 11 CELLS/UL (ref 0–200)
BASOPHILS NFR BLD AUTO: 0.3 %
BILIRUB SERPL-MCNC: 0.4 MG/DL (ref 0.2–1.2)
BUN SERPL-MCNC: 11 MG/DL (ref 7–25)
CALCIUM SERPL-MCNC: 9.3 MG/DL (ref 8.6–10.2)
CHLORIDE SERPL-SCNC: 106 MMOL/L (ref 98–110)
CHOLEST SERPL-MCNC: 224 MG/DL
CHOLEST/HDLC SERPL: 6.4 (CALC)
CO2 SERPL-SCNC: 24 MMOL/L (ref 20–32)
CREAT SERPL-MCNC: 0.75 MG/DL (ref 0.5–0.99)
EGFRCR SERPLBLD CKD-EPI 2021: 98 ML/MIN/1.73M2
EOSINOPHIL # BLD AUTO: 60 CELLS/UL (ref 15–500)
EOSINOPHIL NFR BLD AUTO: 1.7 %
ERYTHROCYTE [DISTWIDTH] IN BLOOD BY AUTOMATED COUNT: 13 % (ref 11–15)
EST. AVERAGE GLUCOSE BLD GHB EST-MCNC: 111 MG/DL
EST. AVERAGE GLUCOSE BLD GHB EST-SCNC: 6.2 MMOL/L
GLUCOSE SERPL-MCNC: 77 MG/DL (ref 65–99)
HBA1C MFR BLD: 5.5 % OF TOTAL HGB
HCT VFR BLD AUTO: 38.8 % (ref 35–45)
HDLC SERPL-MCNC: 35 MG/DL
HGB BLD-MCNC: 12.6 G/DL (ref 11.7–15.5)
LDLC SERPL CALC-MCNC: 169 MG/DL (CALC)
LYMPHOCYTES # BLD AUTO: 2202 CELLS/UL (ref 850–3900)
LYMPHOCYTES NFR BLD AUTO: 62.9 %
MCH RBC QN AUTO: 28.4 PG (ref 27–33)
MCHC RBC AUTO-ENTMCNC: 32.5 G/DL (ref 32–36)
MCV RBC AUTO: 87.6 FL (ref 80–100)
MONOCYTES # BLD AUTO: 291 CELLS/UL (ref 200–950)
MONOCYTES NFR BLD AUTO: 8.3 %
NEUTROPHILS # BLD AUTO: 938 CELLS/UL (ref 1500–7800)
NEUTROPHILS NFR BLD AUTO: 26.8 %
NONHDLC SERPL-MCNC: 189 MG/DL (CALC)
PLATELET # BLD AUTO: 258 THOUSAND/UL (ref 140–400)
PMV BLD REES-ECKER: 10.6 FL (ref 7.5–12.5)
POTASSIUM SERPL-SCNC: 4.2 MMOL/L (ref 3.5–5.3)
PROT SERPL-MCNC: 7.2 G/DL (ref 6.1–8.1)
RBC # BLD AUTO: 4.43 MILLION/UL (ref 3.8–5.1)
SODIUM SERPL-SCNC: 140 MMOL/L (ref 135–146)
TRIGL SERPL-MCNC: 93 MG/DL
TSH SERPL-ACNC: 1.48 MIU/L
WBC # BLD AUTO: 3.5 THOUSAND/UL (ref 3.8–10.8)

## 2025-02-03 ENCOUNTER — APPOINTMENT (OUTPATIENT)
Dept: ORTHOPEDIC SURGERY | Age: 50
End: 2025-02-03
Payer: COMMERCIAL

## 2025-02-03 VITALS — HEIGHT: 62 IN | WEIGHT: 218.2 LBS | BODY MASS INDEX: 40.15 KG/M2

## 2025-02-03 DIAGNOSIS — G89.29 CHRONIC RIGHT-SIDED LOW BACK PAIN WITH RIGHT-SIDED SCIATICA: Primary | ICD-10-CM

## 2025-02-03 DIAGNOSIS — M54.41 CHRONIC RIGHT-SIDED LOW BACK PAIN WITH RIGHT-SIDED SCIATICA: Primary | ICD-10-CM

## 2025-02-03 PROCEDURE — 3008F BODY MASS INDEX DOCD: CPT | Performed by: EMERGENCY MEDICINE

## 2025-02-03 PROCEDURE — 99204 OFFICE O/P NEW MOD 45 MIN: CPT | Performed by: EMERGENCY MEDICINE

## 2025-02-03 PROCEDURE — 1036F TOBACCO NON-USER: CPT | Performed by: EMERGENCY MEDICINE

## 2025-02-03 RX ORDER — MELOXICAM 15 MG/1
15 TABLET ORAL DAILY
Qty: 30 TABLET | Refills: 0 | Status: SHIPPED | OUTPATIENT
Start: 2025-02-03 | End: 2025-03-05

## 2025-02-03 NOTE — PROGRESS NOTES
Subjective    Patient ID: Clarice Ballard is a 49 y.o. female.    Chief Complaint: Pain of the Lower Back (New Patient. Patient coming in with lower back pain/right side. Pain has been ongoing for over a year. No hx trauma, sx or injections. Pain radiates down right side through gluteal area. Patient states theres days the pain is bad and unable to walk. Xray done 11/20/23)     Last Surgery: No surgery found  Last Surgery Date: No surgery found    Clarice is a 49-year-old female coming in with some pain across her lower back that has been present essentially since 2023.  She tells me that back then she fell and has had some lower back pain that is bilateral but mostly right sided since that time.  At times the pain is so severe that she has trouble walking.  She was recently seen by her PCP, Dr. FORBES, who referred her here.  She had x-rays back in November 2023.  She has not yet done physical therapy and has not yet had an MRI.  She denies any severe pain today and has no change in bowel or bladder function.  No numbness or trouble walking today.  The pain sometimes radiates down the right lower extremity.  No other complaints or today.        Objective   Back Exam     Tenderness   The patient is experiencing no tenderness.     Range of Motion   The patient has normal back ROM.  Extension:  normal   Flexion:  normal   Lateral bend right:  normal   Lateral bend left:  normal   Rotation right:  normal   Rotation left:  normal     Muscle Strength   The patient has normal back strength.  Right Quadriceps:  5/5   Left Quadriceps:  5/5   Right Hamstrings:  5/5   Left Hamstrings:  5/5     Tests   Straight leg raise right: negative  Straight leg raise left: negative    Other   Sensation: normal  Gait: normal     Comments:  Weakly positive stork testing on the left side.  Some mild pain is reproduced with back extension.  Range of motion seems to be grossly intact.  No tenderness to palpation over the SI joints or over the lumbar  spine midline.  The midline is without any step-offs or deformities.            Image Results:  Prior x-rays from 2023 were reviewed and interpreted by me on 2/3/2025.  She has some anterior wedging at L1 and L2.  No evidence of acute injury or fracture.  No evidence of spondylolisthesis or spondylolysis.    Assessment/Plan   Encounter Diagnoses:  Chronic right-sided low back pain with right-sided sciatica    Orders Placed This Encounter    Referral to Physical Therapy    meloxicam (Mobic) 15 mg tablet     No follow-ups on file.    We had a long discussion about her treatment options and essentially decided to refer her to physical therapy to work on developing and implementing a home exercise program.  She is also going to start taking meloxicam daily for the next month.  I am going to follow-up with her in about 8 weeks to see how she is responding and whether or not we need to pursue additional imaging with an MRI at that time.  Ultimately she may need referral for spinal injections or to see spinal surgery.  She is also complaining of symptoms in her right foot and wants to see Dr. Lgaunas.     ** Please excuse any errors in grammar or translation related to this dictation. Voice recognition software was utilized to prepare this document. **       Leobardo Smith MD  [facility]

## 2025-02-06 ENCOUNTER — APPOINTMENT (OUTPATIENT)
Dept: ORTHOPEDIC SURGERY | Facility: CLINIC | Age: 50
End: 2025-02-06
Payer: COMMERCIAL

## 2025-02-06 LAB
ALBUMIN/CREAT UR: 11 MG/G CREAT
CREAT UR-MCNC: 178 MG/DL (ref 20–275)
MICROALBUMIN UR-MCNC: 2 MG/DL

## 2025-02-11 ENCOUNTER — HOSPITAL ENCOUNTER (OUTPATIENT)
Dept: RADIOLOGY | Facility: CLINIC | Age: 50
Discharge: HOME | End: 2025-02-11
Payer: COMMERCIAL

## 2025-02-11 DIAGNOSIS — M54.41 CHRONIC RIGHT-SIDED LOW BACK PAIN WITH RIGHT-SIDED SCIATICA: ICD-10-CM

## 2025-02-11 DIAGNOSIS — G89.29 CHRONIC RIGHT-SIDED LOW BACK PAIN WITH RIGHT-SIDED SCIATICA: ICD-10-CM

## 2025-02-11 PROCEDURE — 72148 MRI LUMBAR SPINE W/O DYE: CPT

## 2025-02-11 PROCEDURE — 72148 MRI LUMBAR SPINE W/O DYE: CPT | Performed by: RADIOLOGY

## 2025-02-13 DIAGNOSIS — M79.671 RIGHT FOOT PAIN: ICD-10-CM

## 2025-02-18 ENCOUNTER — APPOINTMENT (OUTPATIENT)
Dept: ORTHOPEDIC SURGERY | Facility: HOSPITAL | Age: 50
End: 2025-02-18
Payer: COMMERCIAL

## 2025-02-18 ENCOUNTER — HOSPITAL ENCOUNTER (OUTPATIENT)
Dept: RADIOLOGY | Facility: HOSPITAL | Age: 50
Discharge: HOME | End: 2025-02-18
Payer: COMMERCIAL

## 2025-02-18 VITALS — BODY MASS INDEX: 40.12 KG/M2 | WEIGHT: 218 LBS | HEIGHT: 62 IN

## 2025-02-18 DIAGNOSIS — M79.671 RIGHT FOOT PAIN: ICD-10-CM

## 2025-02-18 DIAGNOSIS — M19.071 ARTHRITIS OF RIGHT SUBTALAR JOINT: Primary | ICD-10-CM

## 2025-02-18 PROCEDURE — 73630 X-RAY EXAM OF FOOT: CPT | Mod: RIGHT SIDE | Performed by: RADIOLOGY

## 2025-02-18 PROCEDURE — 99214 OFFICE O/P EST MOD 30 MIN: CPT | Performed by: SPECIALIST

## 2025-02-18 PROCEDURE — 3008F BODY MASS INDEX DOCD: CPT | Performed by: SPECIALIST

## 2025-02-18 PROCEDURE — 73630 X-RAY EXAM OF FOOT: CPT | Mod: RT

## 2025-02-18 PROCEDURE — 99204 OFFICE O/P NEW MOD 45 MIN: CPT | Performed by: SPECIALIST

## 2025-02-18 NOTE — PROGRESS NOTES
NPV Right Foot Pain   XR Done Today     Patient has had pain for about 6 months pain is progressing. At times has difficulty bearing weight   No known injury.  She describes the pain as being in the region of the sinus Tarsi/lateral midfoot.  Aggravated with activities decreased with rest.  This been chronic and not associated with trauma.    Exam: Patient alert and oriented x 3 no acute distress.  In stance she has minimal pes planus neutral hindfeet.  The right foot has no external signs of erythema ecchymosis or significant swelling.  She does have pain to palpation of the sinus Tarsi and slightly restricted subtalar motion on the right which also reproduces pain.  The rest of the foot and ankle exams within normal with normal neurovascular status negative Homans.    3 view standing radiographs right foot does show some mild joint space narrowing of the subtalar joint.  Questionable morphology changes of the joint.    Assessment plan: Right subtalar arthritis.  Would like to start conservatively and she had a ankle brace applied today.  We discussed anti-inflammatory creams or medication.  Could follow-up with injection therapy.  Eventually could benefit from a subtalar arthrodesis if worsening.

## 2025-02-28 ENCOUNTER — APPOINTMENT (OUTPATIENT)
Dept: PHARMACY | Facility: HOSPITAL | Age: 50
End: 2025-02-28
Payer: COMMERCIAL

## 2025-03-04 ENCOUNTER — HOSPITAL ENCOUNTER (OUTPATIENT)
Dept: RADIOLOGY | Facility: CLINIC | Age: 50
Discharge: HOME | End: 2025-03-04
Payer: COMMERCIAL

## 2025-03-04 VITALS — WEIGHT: 225 LBS | BODY MASS INDEX: 41.15 KG/M2

## 2025-03-04 DIAGNOSIS — R92.1 BREAST CALCIFICATION, LEFT: ICD-10-CM

## 2025-03-04 PROCEDURE — 77049 MRI BREAST C-+ W/CAD BI: CPT

## 2025-03-04 PROCEDURE — A9575 INJ GADOTERATE MEGLUMI 0.1ML: HCPCS | Performed by: SURGERY

## 2025-03-04 PROCEDURE — 2550000001 HC RX 255 CONTRASTS: Performed by: SURGERY

## 2025-03-04 RX ORDER — GADOTERATE MEGLUMINE 376.9 MG/ML
0.2 INJECTION INTRAVENOUS
Status: COMPLETED | OUTPATIENT
Start: 2025-03-04 | End: 2025-03-04

## 2025-03-04 RX ADMIN — GADOTERATE MEGLUMINE 20 ML: 376.9 INJECTION INTRAVENOUS at 09:40

## 2025-03-05 ENCOUNTER — TELEPHONE (OUTPATIENT)
Dept: SURGERY | Facility: CLINIC | Age: 50
End: 2025-03-05
Payer: COMMERCIAL

## 2025-03-05 DIAGNOSIS — Z12.31 ENCOUNTER FOR SCREENING MAMMOGRAM FOR MALIGNANT NEOPLASM OF BREAST: Primary | ICD-10-CM

## 2025-03-05 NOTE — TELEPHONE ENCOUNTER
----- Message from Marian Hernandez sent at 3/5/2025 11:53 AM EST -----  Let the patient know her MRI looked fine. I ordered a screening mammogram as well for now. If that shows no new findings, she can just undergo screening mammogram yearly.

## 2025-03-06 ENCOUNTER — APPOINTMENT (OUTPATIENT)
Dept: PHARMACY | Facility: HOSPITAL | Age: 50
End: 2025-03-06
Payer: COMMERCIAL

## 2025-03-11 NOTE — PROGRESS NOTES
Pharmacist Clinic: Weight loss Management  Clarice Ballard is a 50 y.o. female was referred to Clinical Pharmacy Team for weight loss management.   Referring Provider: Obi Chairez, *  Last visit: 1/22/2025    Subjective     HPI    CURRENT PHARMACOTHERAPY   Ozempic 1 mg weekly - Saturdays     PAST NON-PHARM/ PHARMACOTHERAPY  Metformin - had a reaction but does not remember what it was    WEIGHT LOSS ASSESSMENT   BMI: 42.06 kg/m²  Recorded Home Weight(s):  Baseline: 224 lbs  Last apt: 222 lbs  Current: 215 lbs   Diet:   Notice curbing of appetite  A lot of veggies  A little carbs and mostly fruits when available  Drink water - sparking water once in awhile  Eats about once a day now  Exercise Routine: none      MEDICATION ASSESSMENT  Affordability/Accessibility: 25$/ 28 ds  Adherence/Organization: no issues at the moment   Adverse Reactions: None    Relevant PMH:  PMH of Pancreatitis? No  PMH of Retinopathy? No  PMH of MTC? No    Objective     There were no vitals taken for this visit.    Allergies   Allergen Reactions    Aspirin Shortness of breath    Oats Dizziness, Drowsiness, Fever, Headache and HIT (heparin induced thrombocytopenia)       Lab Review  Lab Results   Component Value Date    BILITOT 0.4 01/31/2025    CALCIUM 9.3 01/31/2025    CO2 24 01/31/2025     01/31/2025    CREATININE 0.75 01/31/2025    GLUCOSE 77 01/31/2025    ALKPHOS 45 01/31/2025    K 4.2 01/31/2025    PROT 7.2 01/31/2025     01/31/2025    AST 19 01/31/2025    ALT 15 01/31/2025    BUN 11 01/31/2025    ANIONGAP 10 01/31/2025    ALBUMIN 4.4 01/31/2025    LIPASE 29 11/14/2023     Lab Results   Component Value Date    TRIG 93 01/31/2025    CHOL 224 (H) 01/31/2025    HDL 35 (L) 01/31/2025     Lab Results   Component Value Date    HGBA1C 5.5 01/31/2025    HGBA1C 5.5 06/25/2024    HGBA1C 5.9 (H) 02/09/2024     The 10-year ASCVD risk score (Dulce HARRIS, et al., 2019) is: 13.9%    Values used to calculate the score:      Age: 50  years      Sex: Female      Is Non- : Yes      Diabetic: No      Tobacco smoker: No      Systolic Blood Pressure: 177 mmHg      Is BP treated: No      HDL Cholesterol: 35 mg/dL      Total Cholesterol: 224 mg/dL    COUNSELING:   Counseled patient on MOA, expectations, side effects, duration of therapy, contraindications, administration, and monitoring parameters  Answered all patient questions and concerns; provided [facility] phone number if issues/questions arise  ETOH (age>18): Practice safe drinking habits and advice to cut down on liquor/beers as applies   Diet, Weight gain: Advise heart healthy diet (low carbs, low fat; add more fruits/veges and whole grain food) and regular exercise 30 mins daily x 5 days per week.  Also rec 10 mins of aerobic exercise/ jogging daily x 5 days per wk    Assessment/Plan   Problem List Items Addressed This Visit       Insulin resistance    Relevant Orders    Referral to Clinical Pharmacy    Prediabetes    Relevant Orders    Referral to Clinical Pharmacy     Other Visit Diagnoses       Morbid obesity with BMI of 40.0-44.9, adult (Multi)        Relevant Orders    Referral to Clinical Pharmacy          Patient started on 1 mg dose of Ozempic last visit. Has lost about 7 lbs. Denies any side effects. Will continue current dose and follow up next month.    PLAN:  CONTINUE Ozempic 1 mg   Follow up with clinical pharmacist: 4/10/25 @ 3    Thank you,  Vanesa Ortega, PharmD  Clinical Pharmacy Specialist - Primary Care  [phone]  [email or web address]      Verbal consent to manage patient's drug therapy was obtained from the patient. They were informed they may decline to participate or withdraw from participation in pharmacy services at any time.

## 2025-03-12 ENCOUNTER — TELEMEDICINE (OUTPATIENT)
Dept: PHARMACY | Facility: HOSPITAL | Age: 50
End: 2025-03-12
Payer: COMMERCIAL

## 2025-03-12 DIAGNOSIS — E66.01 MORBID OBESITY WITH BMI OF 40.0-44.9, ADULT (MULTI): ICD-10-CM

## 2025-03-12 DIAGNOSIS — R73.03 PREDIABETES: ICD-10-CM

## 2025-03-12 DIAGNOSIS — E88.819 INSULIN RESISTANCE: ICD-10-CM

## 2025-03-13 ENCOUNTER — HOSPITAL ENCOUNTER (OUTPATIENT)
Dept: RADIOLOGY | Facility: CLINIC | Age: 50
Discharge: HOME | End: 2025-03-13
Payer: COMMERCIAL

## 2025-03-13 VITALS — WEIGHT: 225 LBS | HEIGHT: 62 IN | BODY MASS INDEX: 41.41 KG/M2

## 2025-03-13 DIAGNOSIS — Z12.31 ENCOUNTER FOR SCREENING MAMMOGRAM FOR MALIGNANT NEOPLASM OF BREAST: ICD-10-CM

## 2025-03-13 PROCEDURE — 77067 SCR MAMMO BI INCL CAD: CPT | Performed by: RADIOLOGY

## 2025-03-13 PROCEDURE — 77067 SCR MAMMO BI INCL CAD: CPT

## 2025-03-13 PROCEDURE — 77063 BREAST TOMOSYNTHESIS BI: CPT | Performed by: RADIOLOGY

## 2025-03-14 ENCOUNTER — TELEPHONE (OUTPATIENT)
Dept: SURGERY | Facility: CLINIC | Age: 50
End: 2025-03-14
Payer: COMMERCIAL

## 2025-03-14 NOTE — TELEPHONE ENCOUNTER
----- Message from Marian Hernandez sent at 3/14/2025 10:35 AM EDT -----  Please let the patient know her mammogram looked fine. Her next screening is due in 1 year.

## 2025-03-31 ENCOUNTER — APPOINTMENT (OUTPATIENT)
Dept: ORTHOPEDIC SURGERY | Age: 50
End: 2025-03-31
Payer: COMMERCIAL

## 2025-04-08 NOTE — PROGRESS NOTES
Pharmacist Clinic: Weight loss Management  Clarice Ballard is a 50 y.o. female was referred to Clinical Pharmacy Team for weight loss management.   Referring Provider: Obi Chairez, *  Last visit: 1/22/2025    Subjective     HPI    CURRENT PHARMACOTHERAPY   Ozempic 1 mg weekly - Saturdays    PAST NON-PHARM/ PHARMACOTHERAPY  Metformin - had a reaction but does not remember what it was    WEIGHT LOSS ASSESSMENT   BMI: 42.06 kg/m²  Recorded Home Weight(s):  Baseline: 224 lbs  Last apt: 215 lbs  Current: has not weight   Diet:   Notice curbing of appetite  A lot of veggies  A little carbs and mostly fruits when available  Avoiding greasy food   Drink water - sparking water once in awhile  Exercise Routine: try to do 5 mins walk throughout the day every hour      MEDICATION ASSESSMENT  Affordability/Accessibility: 25$/ 28 ds  Adherence/Organization: no issues at the moment   Adverse Reactions: no ADR to report at this visit    Relevant PMH:  PMH of Pancreatitis? No  PMH of Retinopathy? No  PMH of MTC? No    Objective     There were no vitals taken for this visit.    Allergies   Allergen Reactions    Aspirin Shortness of breath    Oats Dizziness, Drowsiness, Fever, Headache and HIT (heparin induced thrombocytopenia)       Lab Review  Lab Results   Component Value Date    BILITOT 0.4 01/31/2025    CALCIUM 9.3 01/31/2025    CO2 24 01/31/2025     01/31/2025    CREATININE 0.75 01/31/2025    GLUCOSE 77 01/31/2025    ALKPHOS 45 01/31/2025    K 4.2 01/31/2025    PROT 7.2 01/31/2025     01/31/2025    AST 19 01/31/2025    ALT 15 01/31/2025    BUN 11 01/31/2025    ANIONGAP 10 01/31/2025    ALBUMIN 4.4 01/31/2025    LIPASE 29 11/14/2023     Lab Results   Component Value Date    TRIG 93 01/31/2025    CHOL 224 (H) 01/31/2025    HDL 35 (L) 01/31/2025     Lab Results   Component Value Date    HGBA1C 5.5 01/31/2025    HGBA1C 5.5 06/25/2024    HGBA1C 5.9 (H) 02/09/2024     The 10-year ASCVD risk score (Dulce HARRIS, et  al., 2019) is: 13.9%    Values used to calculate the score:      Age: 50 years      Sex: Female      Is Non- : Yes      Diabetic: No      Tobacco smoker: No      Systolic Blood Pressure: 177 mmHg      Is BP treated: No      HDL Cholesterol: 35 mg/dL      Total Cholesterol: 224 mg/dL    COUNSELING:   Counseled patient on MOA, expectations, side effects, duration of therapy, contraindications, administration, and monitoring parameters  Answered all patient questions and concerns; provided [facility] phone number if issues/questions arise  ETOH (age>18): Practice safe drinking habits and advice to cut down on liquor/beers as applies   Diet, Weight gain: Advise heart healthy diet (low carbs, low fat; add more fruits/veges and whole grain food) and regular exercise 30 mins daily x 5 days per week.  Also rec 10 mins of aerobic exercise/ jogging daily x 5 days per wk    Assessment/Plan   Problem List Items Addressed This Visit       Insulin resistance    Prediabetes     Other Visit Diagnoses       Morbid obesity with BMI of 40.0-44.9, adult (Multi)                DISCUSSION/ NOTES:    Patient is doing well on current dose. Have not weight herself. Denies any side effects. Will have her continue on current dose and advised patient to weight herself once a week to ensure active weight loss. Patient expressed understanding    PLAN:  CONTINUE   Ozempic to 1 mg weekly  Education provided:   Target goal 5% to 10% weight loss within 3-6 months   Answered all patient questions and concerns; provided [facility] phone number if issues/questions arise   Follow up with clinical pharmacist: 5/8/25 @ 3:20 PM    Thank you,  Mary Gomez, [facility] MED - PGY1 Resident    Verbal consent to manage patient's drug therapy was obtained from the patient. They were informed they may decline to participate or withdraw from participation in pharmacy services at any time.

## 2025-04-10 ENCOUNTER — APPOINTMENT (OUTPATIENT)
Dept: PHARMACY | Facility: HOSPITAL | Age: 50
End: 2025-04-10
Payer: COMMERCIAL

## 2025-04-10 DIAGNOSIS — E88.819 INSULIN RESISTANCE: ICD-10-CM

## 2025-04-10 DIAGNOSIS — E66.01 MORBID OBESITY WITH BMI OF 40.0-44.9, ADULT (MULTI): ICD-10-CM

## 2025-04-10 DIAGNOSIS — R73.03 PREDIABETES: ICD-10-CM

## 2025-05-08 ENCOUNTER — APPOINTMENT (OUTPATIENT)
Dept: PHARMACY | Facility: HOSPITAL | Age: 50
End: 2025-05-08
Payer: COMMERCIAL

## 2025-05-23 ENCOUNTER — APPOINTMENT (OUTPATIENT)
Dept: PRIMARY CARE | Facility: CLINIC | Age: 50
End: 2025-05-23
Payer: COMMERCIAL

## 2025-05-23 VITALS
RESPIRATION RATE: 20 BRPM | WEIGHT: 210.5 LBS | OXYGEN SATURATION: 96 % | DIASTOLIC BLOOD PRESSURE: 78 MMHG | BODY MASS INDEX: 38.74 KG/M2 | HEIGHT: 62 IN | HEART RATE: 90 BPM | TEMPERATURE: 96.8 F | SYSTOLIC BLOOD PRESSURE: 112 MMHG

## 2025-05-23 DIAGNOSIS — R73.03 PREDIABETES: ICD-10-CM

## 2025-05-23 DIAGNOSIS — M48.56XA COLLAPSED VERTEBRA, NOT ELSEWHERE CLASSIFIED, LUMBAR REGION, INITIAL ENCOUNTER FOR FRACTURE (MULTI): ICD-10-CM

## 2025-05-23 DIAGNOSIS — E78.5 HYPERLIPIDEMIA, UNSPECIFIED HYPERLIPIDEMIA TYPE: Primary | ICD-10-CM

## 2025-05-23 DIAGNOSIS — M54.41 CHRONIC RIGHT-SIDED LOW BACK PAIN WITH RIGHT-SIDED SCIATICA: ICD-10-CM

## 2025-05-23 DIAGNOSIS — G89.29 CHRONIC RIGHT-SIDED LOW BACK PAIN WITH RIGHT-SIDED SCIATICA: ICD-10-CM

## 2025-05-23 DIAGNOSIS — E55.9 VITAMIN D DEFICIENCY: ICD-10-CM

## 2025-05-23 DIAGNOSIS — R92.1 CALCIFICATION OF BREAST: ICD-10-CM

## 2025-05-23 PROCEDURE — 1036F TOBACCO NON-USER: CPT | Performed by: INTERNAL MEDICINE

## 2025-05-23 PROCEDURE — 99214 OFFICE O/P EST MOD 30 MIN: CPT | Performed by: INTERNAL MEDICINE

## 2025-05-23 PROCEDURE — 3008F BODY MASS INDEX DOCD: CPT | Performed by: INTERNAL MEDICINE

## 2025-05-23 RX ORDER — ERGOCALCIFEROL 1.25 MG/1
1.25 CAPSULE ORAL WEEKLY
Qty: 12 CAPSULE | Refills: 0 | Status: SHIPPED | OUTPATIENT
Start: 2025-05-23 | End: 2025-08-15

## 2025-05-23 SDOH — ECONOMIC STABILITY: FOOD INSECURITY: WITHIN THE PAST 12 MONTHS, THE FOOD YOU BOUGHT JUST DIDN'T LAST AND YOU DIDN'T HAVE MONEY TO GET MORE.: NEVER TRUE

## 2025-05-23 SDOH — ECONOMIC STABILITY: FOOD INSECURITY: WITHIN THE PAST 12 MONTHS, YOU WORRIED THAT YOUR FOOD WOULD RUN OUT BEFORE YOU GOT MONEY TO BUY MORE.: NEVER TRUE

## 2025-05-23 ASSESSMENT — LIFESTYLE VARIABLES
HOW MANY STANDARD DRINKS CONTAINING ALCOHOL DO YOU HAVE ON A TYPICAL DAY: PATIENT DOES NOT DRINK
SKIP TO QUESTIONS 9-10: 1
AUDIT-C TOTAL SCORE: 0
HOW OFTEN DO YOU HAVE SIX OR MORE DRINKS ON ONE OCCASION: NEVER
HOW OFTEN DO YOU HAVE A DRINK CONTAINING ALCOHOL: NEVER

## 2025-05-23 ASSESSMENT — ANXIETY QUESTIONNAIRES
IF YOU CHECKED OFF ANY PROBLEMS ON THIS QUESTIONNAIRE, HOW DIFFICULT HAVE THESE PROBLEMS MADE IT FOR YOU TO DO YOUR WORK, TAKE CARE OF THINGS AT HOME, OR GET ALONG WITH OTHER PEOPLE: NOT DIFFICULT AT ALL
7. FEELING AFRAID AS IF SOMETHING AWFUL MIGHT HAPPEN: NOT AT ALL
1. FEELING NERVOUS, ANXIOUS, OR ON EDGE: NOT AT ALL
GAD7 TOTAL SCORE: 0
6. BECOMING EASILY ANNOYED OR IRRITABLE: NOT AT ALL
3. WORRYING TOO MUCH ABOUT DIFFERENT THINGS: NOT AT ALL
2. NOT BEING ABLE TO STOP OR CONTROL WORRYING: NOT AT ALL
4. TROUBLE RELAXING: NOT AT ALL
5. BEING SO RESTLESS THAT IT IS HARD TO SIT STILL: NOT AT ALL

## 2025-05-23 ASSESSMENT — ENCOUNTER SYMPTOMS
LOSS OF SENSATION IN FEET: 0
DEPRESSION: 0
OCCASIONAL FEELINGS OF UNSTEADINESS: 0

## 2025-05-23 ASSESSMENT — PAIN SCALES - GENERAL: PAINLEVEL_OUTOF10: 5

## 2025-05-23 NOTE — PROGRESS NOTES
"Chief Complaint/HPI:    Back pain/ vitamin d deficiency:  patient has noted recurrence of low back pain, radiating to the right buttocks. The discomfort has been present for about a week. Patient has felt somewhat depressed ,  passed away last week.      Lumbar stenosis without neurogenic claudication with lumbago: CT Chest wo contrast revealing: \"Minimal anterior wedging of the L1 and L2 vertebral bodies\" noted on x ray in 11/2023. Patient has developed discomfort in the right buttocks region that radiates to the right buttocks now. Menses seems to make the discomfort worse.       Pulmonary Nodules: follow up CT chest was completed in 3/2024. She is currently undergoing regular 6-month MRI surveillance for a 1.1 cm left breast lesion with calcifications. Most recent CT revealed stable lung nodules, she is recommended to have a follow up CT chest in 2 to 4 years ([id number])     HLD: She is not currently on medication therapy.      Prediabetic:  patient has been taking Ozempic 1 mg weekly      Breast calcifications: patient had breast needle biopsy completed, patient had follow up MRI and mammograms completed    ROS otherwise negative aside from what was mentioned above in HPI.      Problem List[1]      Medical History[2]  Surgical History[3]  Social History     Social History Narrative    Not on file         ALLERGIES  Aspirin and Oats      MEDICATIONS  Medications Ordered Prior to Encounter[4]      PHYSICAL EXAM  /78 (BP Location: Right arm, Patient Position: Sitting, BP Cuff Size: Large adult)   Pulse 90   Temp 36 °C (96.8 °F) (Temporal)   Resp 20   Ht 1.575 m (5' 2\")   Wt 95.5 kg (210 lb 8 oz)   SpO2 96%   BMI 38.50 kg/m²   Body mass index is 38.5 kg/m².  Gen: Alert, NAD, she is obese, BMI is under 40 now  HEENT:  EOMI, conjunctiva and sclera normal in appearance, no thyromegaly or neck masses  Respiratory:  Lungs CTAB, diminished breath sounds in the bases bilaterally.   Cardiovascular:  Heart " RRR. No M/R/G, distant heart tones, no carotid bruits noted  Abdomen: obese  Neuro:  Gross motor and sensory intact  MSK: marked tenderness over the lumbar spine and the right para spinal musculature, prior wedge deformity noted on prior x ray (L1-L2), MRI of the LS spine was completed in 2/2025  Skin:  No suspicious lesions present on exposed skin       ASSESSMENT/PLAN  Problem List Items Addressed This Visit       Back pain    Relevant Orders    Vitamin D 25-Hydroxy,Total (for eval of Vitamin D levels)    Serum Protein Electrophoresis + Immunofixation    Referral to Pain Medicine    XR lumbar spine complete 4+ views    Calcification of breast    Relevant Orders    CBC and Auto Differential    Comprehensive Metabolic Panel    Collapsed vertebra, not elsewhere classified, lumbar region, initial encounter for fracture (Multi)    Current Assessment & Plan   MRI was recently completed, patient has vitamin d deficiency also, start vitamin D 50,000 units weekly, recheck labs in 3 months, recheck LS spine x ray, will refer to pain management for review of options, she has seen sports medicine in the past.          Relevant Medications    ergocalciferol (Vitamin D-2) 1250 mcg (50,000 units) capsule    Other Relevant Orders    CBC and Auto Differential    Comprehensive Metabolic Panel    Serum Protein Electrophoresis + Immunofixation    Referral to Pain Medicine    XR lumbar spine complete 4+ views    Hyperlipidemia - Primary    Current Assessment & Plan   Recheck labs, LDL has been elevated , consider starting statin therapy. Patient has been taking Ozempic, she has lost weight          Relevant Orders    CBC and Auto Differential    Comprehensive Metabolic Panel    Lipid Panel    Prediabetes    Current Assessment & Plan   Continue current Ozempic,  follow up with clinical pharmacy, may be able to increase the dose soon, check labs          Relevant Orders    Hemoglobin A1C    CBC and Auto Differential    Comprehensive  Metabolic Panel    Vitamin D deficiency    Relevant Medications    ergocalciferol (Vitamin D-2) 1250 mcg (50,000 units) capsule    Other Relevant Orders    CBC and Auto Differential    Comprehensive Metabolic Panel    Vitamin D 25-Hydroxy,Total (for eval of Vitamin D levels)     Advise follow up in 3 months, will review labs at time of next visit.     Obi Chairez MD          [1]   Patient Active Problem List  Diagnosis    Back pain    Left wrist pain    Right shoulder pain    Lesion of right lung    Well woman exam    Hirsutism    Obesity complicating childbirth ([facility]HS-HCC)    Oligomenorrhea    Abnormal mammogram    Calcification of breast    Hyperlipidemia    Overweight    Right flank pain    Insulin resistance    Prediabetes    Cough in adult patient    Collapsed vertebra, not elsewhere classified, lumbar region, initial encounter for fracture (Multi)    Vitamin D deficiency   [2]   Past Medical History:  Diagnosis Date    Hyperlipemia     Hypertension     PCOS (polycystic ovarian syndrome)     Pre-diabetes    [3]   Past Surgical History:  Procedure Laterality Date    BREAST BIOPSY Left     SHOULDER Right    [4]   Current Outpatient Medications on File Prior to Visit   Medication Sig Dispense Refill    semaglutide (OZEMPIC) 1 mg/dose (4 mg/3 mL) pen injector Inject 1 mg under the skin 1 (one) time per week. 3 mL 11     No current facility-administered medications on file prior to visit.

## 2025-05-23 NOTE — ASSESSMENT & PLAN NOTE
Recheck labs, LDL has been elevated , consider starting statin therapy. Patient has been taking Ozempic, she has lost weight

## 2025-05-23 NOTE — ASSESSMENT & PLAN NOTE
Continue current Ozempic,  follow up with clinical pharmacy, may be able to increase the dose soon, check labs

## 2025-05-23 NOTE — ASSESSMENT & PLAN NOTE
MRI was recently completed, patient has vitamin d deficiency also, start vitamin D 50,000 units weekly, recheck labs in 3 months, recheck LS spine x ray, will refer to pain management for review of options, she has seen sports medicine in the past.

## 2025-07-07 ENCOUNTER — OFFICE VISIT (OUTPATIENT)
Dept: PAIN MEDICINE | Facility: HOSPITAL | Age: 50
End: 2025-07-07
Payer: COMMERCIAL

## 2025-07-07 VITALS
BODY MASS INDEX: 38.64 KG/M2 | OXYGEN SATURATION: 95 % | HEART RATE: 90 BPM | DIASTOLIC BLOOD PRESSURE: 83 MMHG | RESPIRATION RATE: 16 BRPM | HEIGHT: 62 IN | SYSTOLIC BLOOD PRESSURE: 122 MMHG | WEIGHT: 210 LBS

## 2025-07-07 DIAGNOSIS — M54.41 CHRONIC RIGHT-SIDED LOW BACK PAIN WITH RIGHT-SIDED SCIATICA: ICD-10-CM

## 2025-07-07 DIAGNOSIS — M48.56XA COLLAPSED VERTEBRA, NOT ELSEWHERE CLASSIFIED, LUMBAR REGION, INITIAL ENCOUNTER FOR FRACTURE (MULTI): ICD-10-CM

## 2025-07-07 DIAGNOSIS — G89.29 CHRONIC RIGHT-SIDED LOW BACK PAIN WITH RIGHT-SIDED SCIATICA: ICD-10-CM

## 2025-07-07 DIAGNOSIS — M51.362 DEGENERATION OF INTERVERTEBRAL DISC OF LUMBAR REGION WITH DISCOGENIC BACK PAIN AND LOWER EXTREMITY PAIN: ICD-10-CM

## 2025-07-07 DIAGNOSIS — M54.16 LUMBAR NEURITIS: Primary | ICD-10-CM

## 2025-07-07 PROBLEM — M51.369 DEGENERATIVE DISC DISEASE, LUMBAR: Status: ACTIVE | Noted: 2025-07-07

## 2025-07-07 PROCEDURE — 3008F BODY MASS INDEX DOCD: CPT | Performed by: PHYSICAL MEDICINE & REHABILITATION

## 2025-07-07 PROCEDURE — 99214 OFFICE O/P EST MOD 30 MIN: CPT | Performed by: PHYSICAL MEDICINE & REHABILITATION

## 2025-07-07 PROCEDURE — 99204 OFFICE O/P NEW MOD 45 MIN: CPT | Performed by: PHYSICAL MEDICINE & REHABILITATION

## 2025-07-07 SDOH — SOCIAL STABILITY: SOCIAL NETWORK: SOCIAL ACTIVITY:: 9

## 2025-07-07 ASSESSMENT — ENCOUNTER SYMPTOMS
DEPRESSION: 0
LOSS OF SENSATION IN FEET: 0
OCCASIONAL FEELINGS OF UNSTEADINESS: 0

## 2025-07-07 NOTE — PROGRESS NOTES
Subjective   Patient ID: Clarice Ballard is a 50 y.o. female who presents for Back Pain.  HPI    Patient presents to office for initial eval of lower back pain that radiates into the posterior buttock on the right and occasionally up right flank. Patient states that pain is intermittent and not always present but when it is present it affects walking. Pain is worsened by standing/walking for longer periods. Patient endorses occasional numbness and tingling associated with the pain. Patient denies any weakness, loss of bowel/bladder function, saddle anesthesia. Patient has been taking OTC tylenol with flare ups. Patient has not yet done physical therapy.     Pain Score: 6/10 to come in, 2/3 today    Injections/Procedures: denies    Neuromodulators/Other Medications: none    Other: positioning, heat/cold, rest      Review of Systems     Current Outpatient Medications   Medication Instructions    ergocalciferol (VITAMIN D-2) 1.25 mg, oral, Weekly    semaglutide (OZEMPIC) 1 mg, subcutaneous, Weekly        Medical History[1]     Surgical History[2]     Family History[3]     RX Allergies[4]     MR lumbar spine wo IV contrast 02/11/2025    Narrative  Interpreted By:  Antonio Gastelum,  STUDY:  MR LUMBAR SPINE WO IV CONTRAST;  2/11/2025 3:02 pm    INDICATION:  Signs/Symptoms:low back pain, history of deformities L1 and L2, pain  radiates to the right buttocks now. ,M54.41 Lumbago with sciatica,  right side,G89.29 Other chronic pain    COMPARISON:  None.    ACCESSION NUMBER(S):  [id number]    ORDERING CLINICIAN:  BRENNAN GRAHAM    TECHNIQUE:  The lumbar spine was studied in the sagital, axial and coronal planes  utiliing T1 and T2 weighted images.    FINDINGS:  The marrow signal and vertebral body height are normal. The conus and  sacrum are normal. Images at each interspace reveal the following:  T12/L1  There is normal alignment and vertebral body height. The disc space  is normal. There is no evidence of canal or  foraminal narrowing.  There is no evidence of bulging or herniated disc. L1/L2  Circumferential bulging intervertebral disc with midline  subligamentous disc herniation measuring a proximally 5 mm x 15 mm in  size. Slight flattening of the thecal sac without measurable canal  stenosis or foraminal narrowing. L2/L3  There is normal alignment and vertebral body height. The disc space  is normal. There is no evidence of canal or foraminal narrowing.  There is no evidence of bulging or herniated disc. L3/L4  There is normal alignment and vertebral body height. The disc space  is normal. There is no evidence of canal or foraminal narrowing.  There is no evidence of bulging or herniated disc. L4/L5  There is normal alignment and vertebral body height. The disc space  is normal. There is no evidence of canal or foraminal narrowing.  There is no evidence of bulging or herniated disc. L5/S1  There is normal alignment and vertebral body height. The disc space  is normal. There is no evidence of canal or foraminal narrowing.  There is no evidence of bulging or herniated disc.    Impression  * Midline disc herniation at L1/L2 with slight flattening of the  thecal sac. No measurable canal stenosis.    MACRO:  none    Signed by: Antonio Gastelum 2/13/2025 9:12 AM  Dictation workstation:   [id number]      Objective     Vitals:    07/07/25 0837   BP: 122/83   Pulse: 90   Resp: 16   SpO2: 95%             Physical Exam    GENERAL EXAM  Vital Signs: Vital signs to include heart rate, respiration rate, blood pressure, and temperature were reviewed.  General Appearance:  Awake, alert, healthy appearing, well developed, No acute distress.  Head: Normocephalic without evidence of head injury.  Neck: The appearance of the neck was normal without swelling with a midline trachea.  Eyes: The eyelids and eyebrows exhibited no abnormalities.  Pupils were not pin-point.  Sclera was without icterus.  Lungs: Respiration rhythm and depth was  normal.  Respiratory movements were normal without labored breathing.  Cardiovascular: No peripheral edema was present.    Neurological: Patient was oriented to time, place, and person.  Speech was normal.  Balance, gait, and stance were unremarkable.    Psychiatric: Appearance was normal with appropriate dress.  Mood was euthymic and affect was normal.  Skin: Affected regions were without ecchymosis or skin lesions.    Back (MSK/neuro/skin):  Full active and passive range of motion in all planes  Strength 5 out of 5 bilateral lower extremities  Sensation to light-touch grossly intact bilateral lower extremities  Deep tendon reflexes equal bilateral lower extremities  No edema/effusion/erythema  Skin: no skin lesions or scars  B SLR (-)  B sacral spring test (-),   B facet load (-)  B SIJ tenderness (-)   B NAFISA (-)  Full flexion/extension  TTP over lumbar paraspinals        Assessment/Plan   Diagnoses and all orders for this visit:  Lumbar neuritis  -     Referral to Physical Therapy; Future  Collapsed vertebra, not elsewhere classified, lumbar region, initial encounter for fracture (Multi)  -     Referral to Pain Medicine  Chronic right-sided low back pain with right-sided sciatica  -     Referral to Pain Medicine  Degeneration of intervertebral disc of lumbar region with discogenic back pain and lower extremity pain  -     Referral to Physical Therapy; Future    Patient is 50 F here for right sided back pain with radiation to the right posterior buttock. Patient states pain distribution stays in the back/hip with little radiation to the posterior leg. Patient not yet done PT. Patient obtained MRI in Feb 2025 showing L1/L2 disc herniation w/ thecal flattening. Will recommend PT w/ fu in 2 months. Discussed starting cymbalta w/ patient. Patient would like to revisit after PT.    Plan:  - Physical therapy  - fu 2 months         This note was generated with the aid of dictation software, there may be typos despite  my attempts at proofreading.       I saw and evaluated the patient. I personally obtained the key and critical portions of the history and physical exam or was physically present for key and critical portions performed by the resident/fellow. I reviewed the resident/fellow's documentation and discussed the patient with the resident/fellow. I agree with the resident/fellow's medical decision making as documented in the note with the exception/addition of the followin-year-old female with lower back pain with some radiation into her right hip area.  I did personally review the MRI of her lumbar spine from this past February showing fairly significant disc bulging at L1-2.  She is likely having some discogenic pain as well as some lower extremity neuritis in the L1/L2 distribution secondary to this disc bulging.  She has not had conservative treatment yet so as above we will need to start some physical therapy.  We did discuss adding duloxetine however patient would like to hold off for now but we may add at any point.  If patient does not want to add this medication we can do it over the phone as we did discuss the risks benefits of this medication    We could consider an L1-2 interlaminar epidural steroid injection in the future as well.    Mikey Garrido MD        [1]   Past Medical History:  Diagnosis Date    Hyperlipemia     Hypertension     PCOS (polycystic ovarian syndrome)     Pre-diabetes    [2]   Past Surgical History:  Procedure Laterality Date    BREAST BIOPSY Left     SHOULDER Right    [3]   Family History  Problem Relation Name Age of Onset    Diabetes Father's Brother      Diabetes Maternal Grandfather     [4]   Allergies  Allergen Reactions    Aspirin Shortness of breath    Oats Dizziness, Drowsiness, Fever, Headache and HIT (heparin induced thrombocytopenia)

## 2025-07-23 DIAGNOSIS — M54.41 CHRONIC RIGHT-SIDED LOW BACK PAIN WITH RIGHT-SIDED SCIATICA: ICD-10-CM

## 2025-07-23 DIAGNOSIS — R73.03 PREDIABETES: ICD-10-CM

## 2025-07-23 DIAGNOSIS — E55.9 VITAMIN D DEFICIENCY: ICD-10-CM

## 2025-07-23 DIAGNOSIS — G89.29 CHRONIC RIGHT-SIDED LOW BACK PAIN WITH RIGHT-SIDED SCIATICA: ICD-10-CM

## 2025-07-23 DIAGNOSIS — R92.1 CALCIFICATION OF BREAST: ICD-10-CM

## 2025-07-23 DIAGNOSIS — E78.5 HYPERLIPIDEMIA, UNSPECIFIED HYPERLIPIDEMIA TYPE: ICD-10-CM

## 2025-07-23 DIAGNOSIS — M48.56XA COLLAPSED VERTEBRA, NOT ELSEWHERE CLASSIFIED, LUMBAR REGION, INITIAL ENCOUNTER FOR FRACTURE (MULTI): ICD-10-CM

## 2025-08-16 ENCOUNTER — APPOINTMENT (OUTPATIENT)
Dept: CARDIOLOGY | Facility: HOSPITAL | Age: 50
End: 2025-08-16
Payer: COMMERCIAL

## 2025-08-16 ENCOUNTER — HOSPITAL ENCOUNTER (EMERGENCY)
Facility: HOSPITAL | Age: 50
Discharge: HOME | End: 2025-08-16
Attending: EMERGENCY MEDICINE
Payer: COMMERCIAL

## 2025-08-16 ENCOUNTER — APPOINTMENT (OUTPATIENT)
Dept: RADIOLOGY | Facility: HOSPITAL | Age: 50
End: 2025-08-16
Payer: COMMERCIAL

## 2025-08-16 VITALS
SYSTOLIC BLOOD PRESSURE: 125 MMHG | OXYGEN SATURATION: 98 % | RESPIRATION RATE: 20 BRPM | BODY MASS INDEX: 38.41 KG/M2 | TEMPERATURE: 97.2 F | HEIGHT: 62 IN | DIASTOLIC BLOOD PRESSURE: 78 MMHG | HEART RATE: 82 BPM

## 2025-08-16 DIAGNOSIS — U07.1 COVID-19: Primary | ICD-10-CM

## 2025-08-16 LAB
ALBUMIN SERPL BCP-MCNC: 4.1 G/DL (ref 3.4–5)
ALP SERPL-CCNC: 50 U/L (ref 33–110)
ALT SERPL W P-5'-P-CCNC: 16 U/L (ref 7–45)
ANION GAP SERPL CALC-SCNC: 13 MMOL/L (ref 10–20)
AST SERPL W P-5'-P-CCNC: 20 U/L (ref 9–39)
BASOPHILS # BLD AUTO: 0.01 X10*3/UL (ref 0–0.1)
BASOPHILS NFR BLD AUTO: 0.3 %
BILIRUB SERPL-MCNC: 0.3 MG/DL (ref 0–1.2)
BUN SERPL-MCNC: 10 MG/DL (ref 6–23)
CALCIUM SERPL-MCNC: 8.7 MG/DL (ref 8.6–10.3)
CARDIAC TROPONIN I PNL SERPL HS: <3 NG/L (ref 0–13)
CHLORIDE SERPL-SCNC: 103 MMOL/L (ref 98–107)
CO2 SERPL-SCNC: 27 MMOL/L (ref 21–32)
CREAT SERPL-MCNC: 0.69 MG/DL (ref 0.5–1.05)
EGFRCR SERPLBLD CKD-EPI 2021: >90 ML/MIN/1.73M*2
EOSINOPHIL # BLD AUTO: 0.14 X10*3/UL (ref 0–0.7)
EOSINOPHIL NFR BLD AUTO: 3.8 %
ERYTHROCYTE [DISTWIDTH] IN BLOOD BY AUTOMATED COUNT: 12.3 % (ref 11.5–14.5)
FLUAV RNA RESP QL NAA+PROBE: NOT DETECTED
FLUBV RNA RESP QL NAA+PROBE: NOT DETECTED
GLUCOSE SERPL-MCNC: 92 MG/DL (ref 74–99)
HCT VFR BLD AUTO: 35.5 % (ref 36–46)
HGB BLD-MCNC: 12 G/DL (ref 12–16)
IMM GRANULOCYTES # BLD AUTO: 0.01 X10*3/UL (ref 0–0.7)
IMM GRANULOCYTES NFR BLD AUTO: 0.3 % (ref 0–0.9)
LACTATE SERPL-SCNC: 0.9 MMOL/L (ref 0.4–2)
LYMPHOCYTES # BLD AUTO: 2.51 X10*3/UL (ref 1.2–4.8)
LYMPHOCYTES NFR BLD AUTO: 68 %
MAGNESIUM SERPL-MCNC: 1.99 MG/DL (ref 1.6–2.4)
MCH RBC QN AUTO: 28.5 PG (ref 26–34)
MCHC RBC AUTO-ENTMCNC: 33.8 G/DL (ref 32–36)
MCV RBC AUTO: 84 FL (ref 80–100)
MONOCYTES # BLD AUTO: 0.31 X10*3/UL (ref 0.1–1)
MONOCYTES NFR BLD AUTO: 8.4 %
NEUTROPHILS # BLD AUTO: 0.71 X10*3/UL (ref 1.2–7.7)
NEUTROPHILS NFR BLD AUTO: 19.2 %
NRBC BLD-RTO: 0 /100 WBCS (ref 0–0)
PLATELET # BLD AUTO: 233 X10*3/UL (ref 150–450)
POTASSIUM SERPL-SCNC: 3.8 MMOL/L (ref 3.5–5.3)
PROT SERPL-MCNC: 7.3 G/DL (ref 6.4–8.2)
RBC # BLD AUTO: 4.21 X10*6/UL (ref 4–5.2)
SARS-COV-2 RNA RESP QL NAA+PROBE: DETECTED
SODIUM SERPL-SCNC: 139 MMOL/L (ref 136–145)
WBC # BLD AUTO: 3.7 X10*3/UL (ref 4.4–11.3)

## 2025-08-16 PROCEDURE — 71046 X-RAY EXAM CHEST 2 VIEWS: CPT | Performed by: RADIOLOGY

## 2025-08-16 PROCEDURE — 83605 ASSAY OF LACTIC ACID: CPT | Performed by: EMERGENCY MEDICINE

## 2025-08-16 PROCEDURE — 36415 COLL VENOUS BLD VENIPUNCTURE: CPT | Performed by: EMERGENCY MEDICINE

## 2025-08-16 PROCEDURE — 84484 ASSAY OF TROPONIN QUANT: CPT | Performed by: EMERGENCY MEDICINE

## 2025-08-16 PROCEDURE — 71046 X-RAY EXAM CHEST 2 VIEWS: CPT

## 2025-08-16 PROCEDURE — 85025 COMPLETE CBC W/AUTO DIFF WBC: CPT | Performed by: EMERGENCY MEDICINE

## 2025-08-16 PROCEDURE — 99285 EMERGENCY DEPT VISIT HI MDM: CPT | Mod: 25 | Performed by: EMERGENCY MEDICINE

## 2025-08-16 PROCEDURE — 87636 SARSCOV2 & INF A&B AMP PRB: CPT | Performed by: EMERGENCY MEDICINE

## 2025-08-16 PROCEDURE — 80053 COMPREHEN METABOLIC PANEL: CPT | Performed by: EMERGENCY MEDICINE

## 2025-08-16 PROCEDURE — 83735 ASSAY OF MAGNESIUM: CPT | Performed by: EMERGENCY MEDICINE

## 2025-08-16 PROCEDURE — 93005 ELECTROCARDIOGRAM TRACING: CPT

## 2025-08-16 RX ORDER — ONDANSETRON 4 MG/1
4 TABLET, FILM COATED ORAL EVERY 6 HOURS
Qty: 20 TABLET | Refills: 0 | Status: SHIPPED | OUTPATIENT
Start: 2025-08-16

## 2025-08-16 ASSESSMENT — LIFESTYLE VARIABLES
HAVE PEOPLE ANNOYED YOU BY CRITICIZING YOUR DRINKING: NO
EVER FELT BAD OR GUILTY ABOUT YOUR DRINKING: NO
HAVE YOU EVER FELT YOU SHOULD CUT DOWN ON YOUR DRINKING: NO
EVER HAD A DRINK FIRST THING IN THE MORNING TO STEADY YOUR NERVES TO GET RID OF A HANGOVER: NO
TOTAL SCORE: 0

## 2025-08-16 ASSESSMENT — PAIN SCALES - GENERAL: PAINLEVEL_OUTOF10: 10 - WORST POSSIBLE PAIN

## 2025-08-16 ASSESSMENT — PAIN - FUNCTIONAL ASSESSMENT: PAIN_FUNCTIONAL_ASSESSMENT: 0-10

## 2025-08-16 ASSESSMENT — PAIN DESCRIPTION - PAIN TYPE: TYPE: ACUTE PAIN

## 2025-08-16 ASSESSMENT — PAIN DESCRIPTION - DESCRIPTORS: DESCRIPTORS: ACHING

## 2025-08-16 ASSESSMENT — PAIN DESCRIPTION - LOCATION: LOCATION: HEAD

## 2025-08-18 LAB
ATRIAL RATE: 68 BPM
P AXIS: 32 DEGREES
PR INTERVAL: 180 MS
Q ONSET: 251 MS
QRS COUNT: 11 BEATS
QRS DURATION: 92 MS
QT INTERVAL: 404 MS
QTC CALCULATION(BAZETT): 427 MS
QTC FREDERICIA: 419 MS
R AXIS: 34 DEGREES
T AXIS: 29 DEGREES
T OFFSET: 453 MS
VENTRICULAR RATE: 67 BPM

## 2025-08-28 ENCOUNTER — HOSPITAL ENCOUNTER (OUTPATIENT)
Dept: RADIOLOGY | Facility: CLINIC | Age: 50
Discharge: HOME | End: 2025-08-28
Payer: COMMERCIAL

## 2025-08-28 ENCOUNTER — LAB (OUTPATIENT)
Dept: LAB | Facility: HOSPITAL | Age: 50
End: 2025-08-28
Payer: COMMERCIAL

## 2025-08-28 DIAGNOSIS — G89.29 CHRONIC RIGHT-SIDED LOW BACK PAIN WITH RIGHT-SIDED SCIATICA: ICD-10-CM

## 2025-08-28 DIAGNOSIS — M48.56XA COLLAPSED VERTEBRA, NOT ELSEWHERE CLASSIFIED, LUMBAR REGION, INITIAL ENCOUNTER FOR FRACTURE (MULTI): ICD-10-CM

## 2025-08-28 DIAGNOSIS — M54.41 CHRONIC RIGHT-SIDED LOW BACK PAIN WITH RIGHT-SIDED SCIATICA: ICD-10-CM

## 2025-08-28 PROCEDURE — 72110 X-RAY EXAM L-2 SPINE 4/>VWS: CPT

## 2025-08-29 LAB
25(OH)D3+25(OH)D2 SERPL-MCNC: 51 NG/ML (ref 30–100)
ALBUMIN SERPL-MCNC: 4.3 G/DL (ref 3.6–5.1)
ALP SERPL-CCNC: 48 U/L (ref 37–153)
ALT SERPL-CCNC: 18 U/L (ref 6–29)
ANION GAP SERPL CALCULATED.4IONS-SCNC: 10 MMOL/L (CALC) (ref 7–17)
AST SERPL-CCNC: 22 U/L (ref 10–35)
BASOPHILS # BLD AUTO: 22 CELLS/UL (ref 0–200)
BASOPHILS NFR BLD AUTO: 0.5 %
BILIRUB SERPL-MCNC: 0.3 MG/DL (ref 0.2–1.2)
BUN SERPL-MCNC: 10 MG/DL (ref 7–25)
CALCIUM SERPL-MCNC: 9.4 MG/DL (ref 8.6–10.4)
CHLORIDE SERPL-SCNC: 103 MMOL/L (ref 98–110)
CHOLEST SERPL-MCNC: 199 MG/DL
CHOLEST/HDLC SERPL: 5.7 (CALC)
CO2 SERPL-SCNC: 24 MMOL/L (ref 20–32)
CREAT SERPL-MCNC: 0.72 MG/DL (ref 0.5–1.03)
EGFRCR SERPLBLD CKD-EPI 2021: 102 ML/MIN/1.73M2
EOSINOPHIL # BLD AUTO: 112 CELLS/UL (ref 15–500)
EOSINOPHIL NFR BLD AUTO: 2.6 %
ERYTHROCYTE [DISTWIDTH] IN BLOOD BY AUTOMATED COUNT: 13.3 % (ref 11–15)
EST. AVERAGE GLUCOSE BLD GHB EST-MCNC: 114 MG/DL
EST. AVERAGE GLUCOSE BLD GHB EST-SCNC: 6.3 MMOL/L
GLUCOSE SERPL-MCNC: 77 MG/DL (ref 65–99)
HBA1C MFR BLD: 5.6 %
HCT VFR BLD AUTO: 37.3 % (ref 35–45)
HDLC SERPL-MCNC: 35 MG/DL
HGB BLD-MCNC: 12.2 G/DL (ref 11.7–15.5)
LDLC SERPL CALC-MCNC: 138 MG/DL (CALC)
LYMPHOCYTES # BLD AUTO: 2546 CELLS/UL (ref 850–3900)
LYMPHOCYTES NFR BLD AUTO: 59.2 %
MCH RBC QN AUTO: 28.4 PG (ref 27–33)
MCHC RBC AUTO-ENTMCNC: 32.7 G/DL (ref 32–36)
MCV RBC AUTO: 86.9 FL (ref 80–100)
MONOCYTES # BLD AUTO: 460 CELLS/UL (ref 200–950)
MONOCYTES NFR BLD AUTO: 10.7 %
NEUTROPHILS # BLD AUTO: 1161 CELLS/UL (ref 1500–7800)
NEUTROPHILS NFR BLD AUTO: 27 %
NONHDLC SERPL-MCNC: 164 MG/DL (CALC)
PLATELET # BLD AUTO: 274 THOUSAND/UL (ref 140–400)
PMV BLD REES-ECKER: 11 FL (ref 7.5–12.5)
POTASSIUM SERPL-SCNC: 4.1 MMOL/L (ref 3.5–5.3)
PROT SERPL-MCNC: 7.2 G/DL (ref 6.1–8.1)
PROT SERPL-MCNC: 7.4 G/DL (ref 6.4–8.2)
RBC # BLD AUTO: 4.29 MILLION/UL (ref 3.8–5.1)
SODIUM SERPL-SCNC: 137 MMOL/L (ref 135–146)
TRIGL SERPL-MCNC: 139 MG/DL
WBC # BLD AUTO: 4.3 THOUSAND/UL (ref 3.8–10.8)

## 2025-09-02 ENCOUNTER — APPOINTMENT (OUTPATIENT)
Dept: PRIMARY CARE | Facility: CLINIC | Age: 50
End: 2025-09-02
Payer: COMMERCIAL

## 2025-09-02 PROBLEM — H81.10 BENIGN PAROXYSMAL POSITIONAL VERTIGO: Status: ACTIVE | Noted: 2025-09-02

## 2025-09-02 SDOH — ECONOMIC STABILITY: FOOD INSECURITY: WITHIN THE PAST 12 MONTHS, THE FOOD YOU BOUGHT JUST DIDN'T LAST AND YOU DIDN'T HAVE MONEY TO GET MORE.: NEVER TRUE

## 2025-09-02 SDOH — ECONOMIC STABILITY: FOOD INSECURITY: WITHIN THE PAST 12 MONTHS, YOU WORRIED THAT YOUR FOOD WOULD RUN OUT BEFORE YOU GOT MONEY TO BUY MORE.: NEVER TRUE

## 2025-09-02 ASSESSMENT — PAIN SCALES - GENERAL: PAINLEVEL_OUTOF10: 0-NO PAIN

## 2025-09-02 ASSESSMENT — PATIENT HEALTH QUESTIONNAIRE - PHQ9
2. FEELING DOWN, DEPRESSED OR HOPELESS: NOT AT ALL
1. LITTLE INTEREST OR PLEASURE IN DOING THINGS: NOT AT ALL
SUM OF ALL RESPONSES TO PHQ9 QUESTIONS 1 & 2: 0

## 2025-09-02 ASSESSMENT — LIFESTYLE VARIABLES
AUDIT-C TOTAL SCORE: 0
HOW OFTEN DO YOU HAVE A DRINK CONTAINING ALCOHOL: NEVER
HOW MANY STANDARD DRINKS CONTAINING ALCOHOL DO YOU HAVE ON A TYPICAL DAY: PATIENT DOES NOT DRINK
SKIP TO QUESTIONS 9-10: 1
HOW OFTEN DO YOU HAVE SIX OR MORE DRINKS ON ONE OCCASION: NEVER

## 2025-09-03 LAB
ALBUMIN: 4.3 G/DL (ref 3.4–5)
ALPHA 1 GLOBULIN: 0.3 G/DL (ref 0.2–0.6)
ALPHA 2 GLOBULIN: 0.5 G/DL (ref 0.4–1.1)
BETA GLOBULIN: 0.9 G/DL (ref 0.5–1.2)
GAMMA GLOBULIN: 1.4 G/DL (ref 0.5–1.4)
IMMUNOFIXATION COMMENT: NORMAL
PATH REVIEW - SERUM IMMUNOFIXATION: NORMAL
PATH REVIEW-SERUM PROTEIN ELECTROPHORESIS: NORMAL
PROTEIN ELECTROPHORESIS COMMENT: NORMAL

## 2026-03-02 ENCOUNTER — APPOINTMENT (OUTPATIENT)
Dept: PRIMARY CARE | Facility: CLINIC | Age: 51
End: 2026-03-02
Payer: COMMERCIAL